# Patient Record
Sex: FEMALE | Race: WHITE | NOT HISPANIC OR LATINO | Employment: FULL TIME | URBAN - NONMETROPOLITAN AREA
[De-identification: names, ages, dates, MRNs, and addresses within clinical notes are randomized per-mention and may not be internally consistent; named-entity substitution may affect disease eponyms.]

---

## 2018-12-07 ENCOUNTER — HOSPITAL ENCOUNTER (INPATIENT)
Facility: HOSPITAL | Age: 48
LOS: 2 days | Discharge: HOME | End: 2018-12-09
Attending: PSYCHIATRY & NEUROLOGY | Admitting: PSYCHIATRY & NEUROLOGY

## 2018-12-07 DIAGNOSIS — I63.442 CEREBROVASCULAR ACCIDENT (CVA) DUE TO EMBOLISM OF LEFT CEREBELLAR ARTERY (HCC): ICD-10-CM

## 2018-12-07 LAB
ALBUMIN SERPL-MCNC: 4.3 G/DL (ref 3.5–5)
ALBUMIN/GLOB SERPL: 1.2 G/DL (ref 1.1–2.5)
ALP SERPL-CCNC: 66 U/L (ref 24–120)
ALT SERPL W P-5'-P-CCNC: 22 U/L (ref 0–54)
ANION GAP SERPL CALCULATED.3IONS-SCNC: 12 MMOL/L (ref 4–13)
AST SERPL-CCNC: 22 U/L (ref 7–45)
BILIRUB SERPL-MCNC: 0.8 MG/DL (ref 0.1–1)
BUN BLD-MCNC: 13 MG/DL (ref 5–21)
BUN/CREAT SERPL: 18.6 (ref 7–25)
CALCIUM SPEC-SCNC: 9.4 MG/DL (ref 8.4–10.4)
CHLORIDE SERPL-SCNC: 101 MMOL/L (ref 98–110)
CO2 SERPL-SCNC: 27 MMOL/L (ref 24–31)
CREAT BLD-MCNC: 0.7 MG/DL (ref 0.5–1.4)
DEPRECATED RDW RBC AUTO: 37.7 FL (ref 40–54)
ERYTHROCYTE [DISTWIDTH] IN BLOOD BY AUTOMATED COUNT: 12.8 % (ref 12–15)
GFR SERPL CREATININE-BSD FRML MDRD: 89 ML/MIN/1.73
GLOBULIN UR ELPH-MCNC: 3.5 GM/DL
GLUCOSE BLD-MCNC: 88 MG/DL (ref 70–100)
HCT VFR BLD AUTO: 43.5 % (ref 37–47)
HGB BLD-MCNC: 14.5 G/DL (ref 12–16)
MCH RBC QN AUTO: 27.3 PG (ref 28–32)
MCHC RBC AUTO-ENTMCNC: 33.3 G/DL (ref 33–36)
MCV RBC AUTO: 81.9 FL (ref 82–98)
PLATELET # BLD AUTO: 385 10*3/MM3 (ref 130–400)
PMV BLD AUTO: 9.3 FL (ref 6–12)
POTASSIUM BLD-SCNC: 4.3 MMOL/L (ref 3.5–5.3)
PROT SERPL-MCNC: 7.8 G/DL (ref 6.3–8.7)
RBC # BLD AUTO: 5.31 10*6/MM3 (ref 4.2–5.4)
SODIUM BLD-SCNC: 140 MMOL/L (ref 135–145)
WBC NRBC COR # BLD: 9.65 10*3/MM3 (ref 4.8–10.8)

## 2018-12-07 PROCEDURE — 80053 COMPREHEN METABOLIC PANEL: CPT | Performed by: PSYCHIATRY & NEUROLOGY

## 2018-12-07 PROCEDURE — 85027 COMPLETE CBC AUTOMATED: CPT | Performed by: PSYCHIATRY & NEUROLOGY

## 2018-12-07 RX ORDER — ACETAMINOPHEN 325 MG/1
650 TABLET ORAL EVERY 4 HOURS PRN
Status: DISCONTINUED | OUTPATIENT
Start: 2018-12-07 | End: 2018-12-09 | Stop reason: HOSPADM

## 2018-12-07 RX ORDER — ATORVASTATIN CALCIUM 40 MG/1
80 TABLET, FILM COATED ORAL NIGHTLY
Status: DISCONTINUED | OUTPATIENT
Start: 2018-12-07 | End: 2018-12-09 | Stop reason: HOSPADM

## 2018-12-07 RX ORDER — SODIUM CHLORIDE 0.9 % (FLUSH) 0.9 %
3-10 SYRINGE (ML) INJECTION AS NEEDED
Status: DISCONTINUED | OUTPATIENT
Start: 2018-12-07 | End: 2018-12-09 | Stop reason: HOSPADM

## 2018-12-07 RX ORDER — SODIUM CHLORIDE 0.9 % (FLUSH) 0.9 %
3 SYRINGE (ML) INJECTION EVERY 12 HOURS SCHEDULED
Status: DISCONTINUED | OUTPATIENT
Start: 2018-12-07 | End: 2018-12-09 | Stop reason: HOSPADM

## 2018-12-07 RX ORDER — ASPIRIN 325 MG
325 TABLET ORAL DAILY
Status: DISCONTINUED | OUTPATIENT
Start: 2018-12-08 | End: 2018-12-08

## 2018-12-07 RX ORDER — ACETAMINOPHEN 650 MG/1
650 SUPPOSITORY RECTAL EVERY 4 HOURS PRN
Status: DISCONTINUED | OUTPATIENT
Start: 2018-12-07 | End: 2018-12-09 | Stop reason: HOSPADM

## 2018-12-07 RX ORDER — ONDANSETRON 2 MG/ML
4 INJECTION INTRAMUSCULAR; INTRAVENOUS EVERY 6 HOURS PRN
Status: DISCONTINUED | OUTPATIENT
Start: 2018-12-07 | End: 2018-12-09 | Stop reason: HOSPADM

## 2018-12-07 RX ORDER — ASPIRIN 300 MG/1
300 SUPPOSITORY RECTAL DAILY
Status: DISCONTINUED | OUTPATIENT
Start: 2018-12-08 | End: 2018-12-08

## 2018-12-07 RX ADMIN — DESMOPRESSIN ACETATE 80 MG: 0.2 TABLET ORAL at 21:31

## 2018-12-07 RX ADMIN — SODIUM CHLORIDE, PRESERVATIVE FREE 3 ML: 5 INJECTION INTRAVENOUS at 21:31

## 2018-12-07 RX ADMIN — ACETAMINOPHEN 650 MG: 325 TABLET, FILM COATED ORAL at 21:31

## 2018-12-08 ENCOUNTER — APPOINTMENT (OUTPATIENT)
Dept: MRI IMAGING | Facility: HOSPITAL | Age: 48
End: 2018-12-08

## 2018-12-08 ENCOUNTER — APPOINTMENT (OUTPATIENT)
Dept: CT IMAGING | Facility: HOSPITAL | Age: 48
End: 2018-12-08

## 2018-12-08 PROBLEM — I63.9 STROKE (HCC): Status: ACTIVE | Noted: 2018-12-08

## 2018-12-08 LAB
ALBUMIN SERPL-MCNC: 3.8 G/DL (ref 3.5–5)
ALBUMIN/GLOB SERPL: 1.5 G/DL (ref 1.1–2.5)
ALP SERPL-CCNC: 51 U/L (ref 24–120)
ALT SERPL W P-5'-P-CCNC: 28 U/L (ref 0–54)
ANION GAP SERPL CALCULATED.3IONS-SCNC: 12 MMOL/L (ref 4–13)
ARTICHOKE IGE QN: 117 MG/DL (ref 0–99)
AST SERPL-CCNC: 21 U/L (ref 7–45)
BILIRUB SERPL-MCNC: 0.7 MG/DL (ref 0.1–1)
BUN BLD-MCNC: 18 MG/DL (ref 5–21)
BUN/CREAT SERPL: 23.1 (ref 7–25)
CALCIUM SPEC-SCNC: 9.3 MG/DL (ref 8.4–10.4)
CHLORIDE SERPL-SCNC: 103 MMOL/L (ref 98–110)
CHOLEST SERPL-MCNC: 166 MG/DL (ref 130–200)
CO2 SERPL-SCNC: 25 MMOL/L (ref 24–31)
CREAT BLD-MCNC: 0.78 MG/DL (ref 0.5–1.4)
DEPRECATED RDW RBC AUTO: 38 FL (ref 40–54)
ERYTHROCYTE [DISTWIDTH] IN BLOOD BY AUTOMATED COUNT: 12.7 % (ref 12–15)
GFR SERPL CREATININE-BSD FRML MDRD: 78 ML/MIN/1.73
GLOBULIN UR ELPH-MCNC: 2.6 GM/DL
GLUCOSE BLD-MCNC: 85 MG/DL (ref 70–100)
GLUCOSE BLDC GLUCOMTR-MCNC: 140 MG/DL (ref 70–130)
GLUCOSE BLDC GLUCOMTR-MCNC: 82 MG/DL (ref 70–130)
HBA1C MFR BLD: 4.9 %
HCT VFR BLD AUTO: 41.9 % (ref 37–47)
HDLC SERPL-MCNC: 37 MG/DL
HGB BLD-MCNC: 13.8 G/DL (ref 12–16)
LDLC/HDLC SERPL: 2.83 {RATIO}
MCH RBC QN AUTO: 27.2 PG (ref 28–32)
MCHC RBC AUTO-ENTMCNC: 32.9 G/DL (ref 33–36)
MCV RBC AUTO: 82.5 FL (ref 82–98)
PLATELET # BLD AUTO: 364 10*3/MM3 (ref 130–400)
PMV BLD AUTO: 9.6 FL (ref 6–12)
POTASSIUM BLD-SCNC: 4.5 MMOL/L (ref 3.5–5.3)
PROT SERPL-MCNC: 6.4 G/DL (ref 6.3–8.7)
RBC # BLD AUTO: 5.08 10*6/MM3 (ref 4.2–5.4)
SODIUM BLD-SCNC: 140 MMOL/L (ref 135–145)
TRIGL SERPL-MCNC: 122 MG/DL (ref 0–149)
WBC NRBC COR # BLD: 8.65 10*3/MM3 (ref 4.8–10.8)

## 2018-12-08 PROCEDURE — 70553 MRI BRAIN STEM W/O & W/DYE: CPT

## 2018-12-08 PROCEDURE — 97161 PT EVAL LOW COMPLEX 20 MIN: CPT

## 2018-12-08 PROCEDURE — G8980 MOBILITY D/C STATUS: HCPCS

## 2018-12-08 PROCEDURE — G8987 SELF CARE CURRENT STATUS: HCPCS | Performed by: OCCUPATIONAL THERAPIST

## 2018-12-08 PROCEDURE — 70498 CT ANGIOGRAPHY NECK: CPT

## 2018-12-08 PROCEDURE — 83036 HEMOGLOBIN GLYCOSYLATED A1C: CPT | Performed by: PSYCHIATRY & NEUROLOGY

## 2018-12-08 PROCEDURE — 97165 OT EVAL LOW COMPLEX 30 MIN: CPT | Performed by: OCCUPATIONAL THERAPIST

## 2018-12-08 PROCEDURE — G8988 SELF CARE GOAL STATUS: HCPCS | Performed by: OCCUPATIONAL THERAPIST

## 2018-12-08 PROCEDURE — 80053 COMPREHEN METABOLIC PANEL: CPT | Performed by: PSYCHIATRY & NEUROLOGY

## 2018-12-08 PROCEDURE — 82962 GLUCOSE BLOOD TEST: CPT

## 2018-12-08 PROCEDURE — 0 GADOBENATE DIMEGLUMINE 529 MG/ML SOLUTION: Performed by: PSYCHIATRY & NEUROLOGY

## 2018-12-08 PROCEDURE — G8989 SELF CARE D/C STATUS: HCPCS | Performed by: OCCUPATIONAL THERAPIST

## 2018-12-08 PROCEDURE — 70496 CT ANGIOGRAPHY HEAD: CPT

## 2018-12-08 PROCEDURE — A9577 INJ MULTIHANCE: HCPCS | Performed by: PSYCHIATRY & NEUROLOGY

## 2018-12-08 PROCEDURE — G8979 MOBILITY GOAL STATUS: HCPCS

## 2018-12-08 PROCEDURE — G8978 MOBILITY CURRENT STATUS: HCPCS

## 2018-12-08 PROCEDURE — 99223 1ST HOSP IP/OBS HIGH 75: CPT | Performed by: PSYCHIATRY & NEUROLOGY

## 2018-12-08 PROCEDURE — 0 IOPAMIDOL PER 1 ML: Performed by: PSYCHIATRY & NEUROLOGY

## 2018-12-08 PROCEDURE — 85027 COMPLETE CBC AUTOMATED: CPT | Performed by: PSYCHIATRY & NEUROLOGY

## 2018-12-08 PROCEDURE — 80061 LIPID PANEL: CPT | Performed by: PSYCHIATRY & NEUROLOGY

## 2018-12-08 RX ORDER — LISINOPRIL 20 MG/1
20 TABLET ORAL DAILY
COMMUNITY
End: 2018-12-18

## 2018-12-08 RX ORDER — CETIRIZINE HYDROCHLORIDE 10 MG/1
10 TABLET ORAL DAILY
COMMUNITY

## 2018-12-08 RX ORDER — ASPIRIN 81 MG/1
81 TABLET, CHEWABLE ORAL DAILY
Status: DISCONTINUED | OUTPATIENT
Start: 2018-12-09 | End: 2018-12-09 | Stop reason: HOSPADM

## 2018-12-08 RX ADMIN — SODIUM CHLORIDE, PRESERVATIVE FREE 3 ML: 5 INJECTION INTRAVENOUS at 20:37

## 2018-12-08 RX ADMIN — SODIUM CHLORIDE, PRESERVATIVE FREE 3 ML: 5 INJECTION INTRAVENOUS at 10:02

## 2018-12-08 RX ADMIN — IOPAMIDOL 125 ML: 755 INJECTION, SOLUTION INTRAVENOUS at 14:27

## 2018-12-08 RX ADMIN — GADOBENATE DIMEGLUMINE 10 ML: 529 INJECTION, SOLUTION INTRAVENOUS at 09:20

## 2018-12-08 RX ADMIN — DESMOPRESSIN ACETATE 80 MG: 0.2 TABLET ORAL at 20:37

## 2018-12-08 RX ADMIN — ASPIRIN 325 MG: 325 TABLET ORAL at 10:02

## 2018-12-08 NOTE — PLAN OF CARE
Problem: Stroke (Ischemic) (Adult)  Goal: Signs and Symptoms of Listed Potential Problems Will be Absent, Minimized or Managed (Stroke)  Outcome: Ongoing (interventions implemented as appropriate)      Problem: Fall Risk (Adult)  Goal: Identify Related Risk Factors and Signs and Symptoms  Outcome: Ongoing (interventions implemented as appropriate)      Problem: Patient Care Overview  Goal: Plan of Care Review  Outcome: Ongoing (interventions implemented as appropriate)   12/08/18 0119   Coping/Psychosocial   Plan of Care Reviewed With patient   Plan of Care Review   Progress improving   OTHER   Outcome Summary Pt a& o x4. No neuro changes. Reports symptoms have resolved. Up ad [place]. Denies any pain. VSS. Safety maintained.

## 2018-12-08 NOTE — PLAN OF CARE
Problem: Patient Care Overview  Goal: Plan of Care Review  Outcome: Ongoing (interventions implemented as appropriate)   12/08/18 4895   Coping/Psychosocial   Plan of Care Reviewed With patient   Plan of Care Review   Progress improving   OTHER   Outcome Summary OT eval completed. Pt was I with all bed mobility, t/fs, toileting, LB dressing and functional mobility. Pt is at her PLOF and does not require skilled OT services at this time. Anticipated d/c from with assist. OT to sign off.

## 2018-12-08 NOTE — PLAN OF CARE
Problem: Patient Care Overview  Goal: Plan of Care Review  Outcome: Ongoing (interventions implemented as appropriate)   12/08/18 0209   Coping/Psychosocial   Plan of Care Reviewed With patient   OTHER   Outcome Summary PT IE complete. Pt independent w/ mobility. No skilled PT needs. PT to sign off. Thank you for referral.

## 2018-12-08 NOTE — THERAPY DISCHARGE NOTE
Acute Care - Physical Therapy Initial Eval/Discharge  [facility]     Patient Name: Albina Lopez  : 1970  MRN: [id number]  Today's Date: 2018   Onset of Illness/Injury or Date of Surgery: 18  Date of Referral to PT: 18  Referring Physician: Dr. Clark      Admit Date: 2018    Visit Dx:    ICD-10-CM ICD-9-CM   1. 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 No pertinent past medical history.  History reviewed. No pertinent surgical history.       PT ASSESSMENT (last 12 hours)      Physical Therapy Evaluation     Row Name 18 1515          PT Evaluation Time/Intention    Subjective Information  complains of;dizziness much improved  -     Document Type  evaluation  -     Mode of Treatment  physical therapy  -     Row Name 18 1515          General Information    Patient Profile Reviewed?  yes  -     Onset of Illness/Injury or Date of Surgery  18  -     Referring Physician  Dr. Clark  -     Patient Observations  alert;cooperative;agree to therapy  -     General Observations of Patient  fowlers in bed  -     Prior Level of Function  independent:;all household mobility;community mobility;ADL's  -     Equipment Currently Used at Home  none  -     Pertinent History of Current Functional Problem  acute cerebellar infarct  -     Row Name 18 1515          Relationship/Environment    Primary Source of Support/Comfort  spouse  -     Lives With  spouse  -     Row Name 18 1515          Resource/Environmental Concerns    Current Living Arrangements  home/apartment/condo  -     Row Name 18 1515          Home Main Entrance    Number of Stairs, Main Entrance  ten  -     Stair Railings, Main Entrance  none  -     Row Name 18 1515          Stairs Within Home, Primary    Stairs, Within Home,  Primary  15  -     Stair Railings, Within Home, Primary  railing on right side (ascending)  -[facility] Name 12/08/18 1515          Cognitive Assessment/Intervention- PT/OT    Affect/Mental Status (Cognitive)  WNL  -     Orientation Status (Cognition)  oriented x 4  -[facility] Name 12/08/18 1515          Bed Mobility Assessment/Treatment    Comment (Bed Mobility)  independent  -[facility] Name 12/08/18 1515          Transfer Assessment/Treatment    Comment (Transfers)  independent  -[facility] Name 12/08/18 1515          Gait/Stairs Assessment/Training    [place] Level (Gait)  independent  -     Distance in Feet (Gait)  300  -     [place] Level (Stairs)  independent  -     Handrail Location (Stairs)  right side (ascending)  -     Number of Steps (Stairs)  6  -     Ascending Technique (Stairs)  step-over-step  -     Descending Technique (Stairs)  step-over-step  -     Comment (Gait/Stairs)  gait w/ head movments, turn and stop  -[facility] Name 12/08/18 1515          General ROM    GENERAL ROM COMMENTS  WFL  -[facility] Name 12/08/18 1515          MMT (Manual Muscle Testing)    General MMT Comments  5/5  -[facility] Name 12/08/18 1515          Pain Assessment    Additional Documentation  Pain Scale: Numbers Pre/Post-Treatment (Group)  -[facility] Name 12/08/18 1515          Pain Scale: Numbers Pre/Post-Treatment    Pain Scale: Numbers, Pretreatment  0/10 - no pain  -     Pain Intervention(s)  Medication (See MAR)  -[facility] Name 12/08/18 1515          Plan of Care Review    Plan of Care Reviewed With  patient  -[facility] Name 12/08/18 1515          Physical Therapy Clinical Impression    Date of Referral to PT  12/07/18  -[facility] Name 12/08/18 1515          Positioning and Restraints    Pre-Treatment Position  in bed  -     Post Treatment Position  bed  -     In Bed  fowlers;call light within reach;encouraged to call for assist;with family/caregiver;side rails up x2  -      Row Name 12/08/18 1515          Living Environment    Home Accessibility  stairs to enter home;stairs within home  -       User Key  (r) = Recorded By, (t) = Taken By, (c) = Cosigned By    Initials Name Provider Type     Jesus Alberto Alexandre, PT Physical Therapist              PT Recommendation and Plan  Anticipated Discharge Disposition (PT): home  Therapy Frequency (PT Clinical Impression): evaluation only  Outcome Summary/Treatment Plan (PT)  Anticipated Discharge Disposition (PT): home  Plan of Care Reviewed With: patient  Outcome Summary: PT IE complete.  Pt independent w/ mobility.  No skilled PT needs.  PT to sign off.  Thank you for referral.    Outcome Measures     Row Name 12/08/18 1549 12/08/18 1500          How much help from another person do you currently need...    Turning from your back to your side while in flat bed without using bedrails?  4  -LH  --     Moving from lying on back to sitting on the side of a flat bed without bedrails?  4  -LH  --     Moving to and from a bed to a chair (including a wheelchair)?  4  -LH  --     Standing up from a chair using your arms (e.g., wheelchair, bedside chair)?  4  -LH  --     Climbing 3-5 steps with a railing?  4  -LH  --     To walk in hospital room?  4  -LH  --     AM-PAC 6 Clicks Score  24  -LH  --        How much help from another is currently needed...    Putting on and taking off regular lower body clothing?  --  4  -JJ     Bathing (including washing, rinsing, and drying)  --  4  -JJ     Toileting (which includes using toilet bed pan or urinal)  --  4  -JJ     Putting on and taking off regular upper body clothing  --  4  -JJ     Taking care of personal grooming (such as brushing teeth)  --  4  -JJ     Eating meals  --  4  -JJ     Score  --  24  -JJ        Modified [place] Scale    Modified [place] Scale  1 - No significant disability despite symptoms.  Able to carry out all usual duties and activities.  -LH  --        Functional Assessment    Outcome  Measure Options  AM-PAC 6 Clicks Basic Mobility (PT);Modified [place]  -  AM-PAC 6 Clicks Daily Activity (OT)  -J       User Key  (r) = Recorded By, (t) = Taken By, (c) = Cosigned By    Initials Name Provider Type     Jesus Alberto Alexandre, PT Physical Therapist    Carmen Muñoz, OTR/L Occupational Therapist           Time Calculation:   PT Charges     Row Name 12/08/18 1552             Time Calculation    Start Time  1515  -      Stop Time  1545  -      Time Calculation (min)  30 min  -      PT Received On  12/08/18  -        User Key  (r) = Recorded By, (t) = Taken By, (c) = Cosigned By    Initials Name Provider Type     Jesus Alberto Alexandre, PT Physical Therapist        Therapy Suggested Charges     Code   Minutes Charges    None           Therapy Charges for Today     Code Description Service Date Service Provider Modifiers Qty    80498323034 HC PT MOBILITY CURRENT 12/8/2018 Jesus Alberto Alexandre, PT GP, CH 1    74456528976 HC PT MOBILITY PROJECTED 12/8/2018 Jesus Alberto Alexandre, PT GP, CH 1    55229172794 HC PT MOBILITY DISCHARGE 12/8/2018 Jesus Alberto Alexandre, PT GP, CH 1    55597431615 HC PT EVAL LOW COMPLEXITY 2 12/8/2018 Jesus Alberto Alexandre, PT GP, KX 1          PT G-Codes  Outcome Measure Options: AM-PAC 6 Clicks Basic Mobility (PT), Modified [place]  AM-PAC 6 Clicks Score: 24  Score: 24  Modified [place] Scale: 1 - No significant disability despite symptoms.  Able to carry out all usual duties and activities.  Functional Limitation: Mobility: Walking and moving around  Mobility: Walking and Moving Around Current Status (): 0 percent impaired, limited or restricted  Mobility: Walking and Moving Around Goal Status (): 0 percent impaired, limited or restricted  Mobility: Walking and Moving Around Discharge Status (): 0 percent impaired, limited or restricted    PT Discharge Summary  Anticipated Discharge Disposition (PT): home  Reason for Discharge: Independent  Outcomes Achieved: (1x visit)  Discharge Destination:  Home    Jesus Alberto Alexandre, PT  12/8/2018

## 2018-12-08 NOTE — PLAN OF CARE
Problem: Patient Care Overview  Goal: Plan of Care Review  Outcome: Outcome(s) achieved Date Met: 12/08/18 12/08/18 4313   OTHER   Outcome Summary Cardiac diet, oral inake insufficient at this time. Consult for If HgbA1c >7.5%; pt HgbA1c 4.9%. Continue to monitor

## 2018-12-08 NOTE — H&P
Neurology History & Physical    Indication for Admission: stroke    History of present illness:      48-year-old female with no significant medical history other than hypertension who on Tuesday of this week had a sudden onset of some mild headache, vertigo, right arm and leg discrimination.  This waxed and waned over the next 3 days.  She did notice some difficulties with swallowing as well.  On Friday she found visited her primary care physician who sent her to the local emergency department in [facility].  There head CT showed concern for cerebellar stroke.  She was accepted in transfer here.  This morning she feels better.  She is tolerating a by mouth diet well.  She has some mild residual vertigo but otherwise has no complaints of ataxia.  She denies any weakness or numbness.  She denies a headache currently.  She has no family history of hypercoagulable states.    No past medical history on file.  Past mental history is hypertension only.  No Known Allergies  Medications Prior to Admission   Medication Sig Dispense Refill Last Dose   • cetirizine (zyrTEC) 10 MG tablet Take 10 mg by mouth Daily.   12/7/2018 at Unknown time   • lisinopril (PRINIVIL,ZESTRIL) 20 MG tablet Take 20 mg by mouth Daily.   12/7/2018 at Unknown time       Social History     Socioeconomic History   • Marital status:      Spouse name: Not on file   • Number of children: Not on file   • Years of education: Not on file   • Highest education level: Not on file   Social Needs   • Financial resource strain: Not on file   • Food insecurity - worry: Not on file   • Food insecurity - inability: Not on file   • Transportation needs - medical: Not on file   • Transportation needs - non-medical: Not on file   Occupational History   • Not on file   Tobacco Use   • Smoking status: Former Smoker   Substance and Sexual Activity   • Alcohol use: Not on file   • Drug use: Not on file   • Sexual activity: Not on file   Other Topics Concern   • Not  on file   Social History Narrative   • Not on file   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  She does not use out all tobacco or drugs.  She is  and her  is at the bedside  No family history on file.  Her mother had a heart attack in her 60s  Review of Systems  Review of Systems - a 14 point review of systems was performed was positive only for ataxia.    Vital Signs   Temp:  [97.3 °F (36.3 °C)-99.1 °F (37.3 °C)] 99.1 °F (37.3 °C)  Heart Rate:  [71-99] 99  Resp:  [16-18] 16  BP: ()/(51-79) 98/56    General Exam:  Head:  Normal cephalic, atraumatic  HEENT:  Neck supple  Fundoscopic Exam:  No signs of disc edema  CVS:  Regular rate and rhythm.  No murmurs  Carotid Examination:  No bruits  Lungs:  Clear to auscultation  Abdomen:  Non-tender, Non-distended  Extremities:  No signs of peripheral edema  Skin:  No rashes    Neurologic Exam:    Mental Status:    -Awake, Alert, Oriented X 3  -No word finding difficulties  -No aphasia  -No dysarthria  -Follows simple and complex commands    CN II:  Visual fields full.  Pupils equally reactive to light  CN III, IV, VI:  Extraocular Muscles full with no signs of nystagmus  CN V:  Facial sensory is symmetric with no asymmetries.  CN VII:  Facial motor symmetric  CN VIII:  Gross hearing intact bilaterally  CN IX:  Palate elevates symmetrically  CN X:  Palate elevates symmetrically  CN XI:  Shoulder shrug symmetric  CN XII:  Tongue is midline on protrusion    Motor: (strength out of 5:  1= minimal movement, 2 = movement in plane of gravity, 3 = movement against gravity, 4 = movement against some resistance, 5 = full strength)    -Right Upper Ext: Proximal: 5 Distal: 5  -Left Upper Ext: Proximal: 5 Distal: 5    -Right Lower Ext: Proximal: 5 Distal: 5  -Left Lower Ext: Proximal: 5 Distal: 5    DTR:  -Right   Bicep: 2+ Tricep: 2+ Brachoradialis: 2+   Patella: 2+ Ankle: 2+ Neg Babinski  -Left   Bicep: 2+ Tricep: 2+ Brachoradialis: 2+   Patella: 2+ Ankle:  2+ Neg Babinski    Sensory:  -Intact to light touch, pinprick, temperature, pain, and proprioception    Coordination:  -Finger to nose intact  -Heel to shin intact  -No ataxia    Gait  -No signs of ataxia  -ambulates unassisted      Results Review:  Lab Results (last 7 days)     Procedure Component Value Units Date/Time    POC Glucose Once [[id number]]  (Abnormal) Collected:  12/08/18 1201    Specimen:  Blood Updated:  12/08/18 1212     Glucose 140 mg/dL      Comment: : [id number] Sagar CollazoMeter ID: [id number]       Hemoglobin A1c [[id number]] Collected:  12/08/18 0357    Specimen:  Blood Updated:  12/08/18 0616     Hemoglobin A1C 4.9 %     Narrative:       Less than 6.0           Non-Diabetic Range  6.0-7.0                 ADA Therapeutic Target  Greater than 7.0        Action Suggested    Lipid Panel [[id number]]  (Abnormal) Collected:  12/08/18 0357    Specimen:  Blood Updated:  12/08/18 0536     Total Cholesterol 166 mg/dL      Triglycerides 122 mg/dL      HDL Cholesterol 37 mg/dL      LDL Cholesterol  117 mg/dL      LDL/HDL Ratio 2.83    Comprehensive Metabolic Panel [[id number]] Collected:  12/08/18 0357    Specimen:  Blood Updated:  12/08/18 0525     Glucose 85 mg/dL      BUN 18 mg/dL      Creatinine 0.78 mg/dL      Sodium 140 mmol/L      Potassium 4.5 mmol/L      Chloride 103 mmol/L      CO2 25.0 mmol/L      Calcium 9.3 mg/dL      Total Protein 6.4 g/dL      Albumin 3.80 g/dL      ALT (SGPT) 28 U/L      AST (SGOT) 21 U/L      Alkaline Phosphatase 51 U/L      Total Bilirubin 0.7 mg/dL      eGFR Non African Amer 78 mL/min/1.73      Globulin 2.6 gm/dL      A/G Ratio 1.5 g/dL      BUN/Creatinine Ratio 23.1     Anion Gap 12.0 mmol/L     CBC (No Diff) [[id number]]  (Abnormal) Collected:  12/08/18 0357    Specimen:  Blood Updated:  12/08/18 0514     WBC 8.65 10*3/mm3      RBC 5.08 10*6/mm3      Hemoglobin 13.8 g/dL      Hematocrit 41.9 %      MCV 82.5 fL      MCH 27.2 pg      MCHC 32.9 g/dL      RDW 12.7 %       RDW-SD 38.0 fl      MPV 9.6 fL      Platelets 364 10*3/mm3     Comprehensive Metabolic Panel [[id number]] Collected:  12/07/18 2050    Specimen:  Blood Updated:  12/07/18 2113     Glucose 88 mg/dL      BUN 13 mg/dL      Creatinine 0.70 mg/dL      Sodium 140 mmol/L      Potassium 4.3 mmol/L      Chloride 101 mmol/L      CO2 27.0 mmol/L      Calcium 9.4 mg/dL      Total Protein 7.8 g/dL      Albumin 4.30 g/dL      ALT (SGPT) 22 U/L      AST (SGOT) 22 U/L      Alkaline Phosphatase 66 U/L      Total Bilirubin 0.8 mg/dL      eGFR Non African Amer 89 mL/min/1.73      Globulin 3.5 gm/dL      A/G Ratio 1.2 g/dL      BUN/Creatinine Ratio 18.6     Anion Gap 12.0 mmol/L     CBC (No Diff) [[id number]]  (Abnormal) Collected:  12/07/18 2050    Specimen:  Blood Updated:  12/07/18 2103     WBC 9.65 10*3/mm3      RBC 5.31 10*6/mm3      Hemoglobin 14.5 g/dL      Hematocrit 43.5 %      MCV 81.9 fL      MCH 27.3 pg      MCHC 33.3 g/dL      RDW 12.8 %      RDW-SD 37.7 fl      MPV 9.3 fL      Platelets 385 10*3/mm3         Patient Active Problem List   Diagnosis   • Stroke (CMS/HCC)         MRI of brain reviewed by me shows left paramedian cerebellar stroke in the vermis  Impression:    1.  Acute cerebellar infarct cryptogenic in nature  2.  Hypertension    Plan:    · No tPA secondary presenting outside of the 4.5 hour window  · continue aspirin 81 mg  · Continue Lipitor 80 mg daily  · Cardiac echo pending  · CT angiography of the head and neck  · Therapy consultations  · SCDs for DVT prophylaxis  · Hypercoagulable panel    Andrea Clark MD  12/08/18  1:34 PM

## 2018-12-08 NOTE — THERAPY DISCHARGE NOTE
Acute Care - Occupational Therapy Initial Eval/Discharge  [facility]     Patient Name: Albina Lopez  : 1970  MRN: [id number]  Today's Date: 2018  Onset of Illness/Injury or Date of Surgery: (P) 18  Date of Referral to OT: 18  Referring Physician: (P) Dr. Clark      Admit Date: 2018       ICD-10-CM ICD-9-CM   1. 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 No pertinent past medical history.  History reviewed. No pertinent surgical history.       OT ASSESSMENT FLOWSHEET (last 72 hours)      Occupational Therapy Evaluation     Row Name 18 1316                   OT Evaluation Time/Intention    Subjective Information  no complaints  -JJ        Document Type  evaluation  -JJ        Mode of Treatment  occupational therapy;concurrent therapy  -JJ        Patient Effort  good  -JJ           General Information    Patient Profile Reviewed?  yes  -JJ        Onset of Illness/Injury or Date of Surgery  18  -JJ        Referring Physician  Dr. Clark  -JJ        Patient Observations  alert;agree to therapy;cooperative  -JJ        Patient/Family Observations  --  -JJ        General Observations of Patient  fowlers in bed, pulse ox, IV infusing, telemetry.   -JJ        Prior Level of Function  independent:;all household mobility;community mobility;transfer;bed mobility;ADL's  -JJ        Equipment Currently Used at Home  none  -JJ        Pertinent History of Current Functional Problem  Pt t/f to [facility] with stroke symptoms. Pt reports onset of mild headache, vertigo, R arm and leg discrimination on . MRI Brain : Acute cerebellar infarct cryptogenic in nature. Dx: CVA, HTN.   -JJ        Risks Reviewed  patient:;LOB;nausea/vomiting;dizziness;increased discomfort;change in vital signs;increased drainage;lines disloged  -JJ        Benefits Reviewed   patient:;improve function;increase independence;increase strength;increase balance;decrease pain;decrease risk of DVT;improve skin integrity;increase knowledge  -           Relationship/Environment    Primary Source of Support/Comfort  spouse  -J        Lives With  spouse  -        Family Caregiver if Needed  spouse  -           Resource/Environmental Concerns    Current Living Arrangements  home/apartment/condo  -           Home Main Entrance    Number of Stairs, Main Entrance  ten  -JJ        Stair Railings, Main Entrance  none  -J           Stairs Within Home, Primary    Stairs, Within Home, Primary  15  -JJ        Stair Railings, Within Home, Primary  railing on right side (ascending)  -           Cognitive Assessment/Interventions    Additional Documentation  Cognitive Assessment/Intervention (Group)  -           Cognitive Assessment/Intervention- PT/OT    Affect/Mental Status (Cognitive)  WNL  -        Orientation Status (Cognition)  oriented x 4  -        Follows Commands (Cognition)  WNL  -        Cognitive Function (Cognitive)  WNL  -        Personal Safety Interventions  fall prevention program maintained;gait belt;muscle strengthening facilitated;nonskid shoes/slippers when out of bed;supervised activity  -           Bed Mobility Assessment/Treatment    Bed Mobility Assessment/Treatment  scooting/bridging;supine-sit;sit-supine  -        Scooting/Bridging [place] (Bed Mobility)  independent  -        Supine-Sit [place] (Bed Mobility)  independent  -        Sit-Supine [place] (Bed Mobility)  independent  -           Functional Mobility    Functional Mobility- Ind. Level  independent  -        Functional Mobility- Comment  Amb from bed to end of hallway and back to bed with no LOB independently.   -           Transfer Assessment/Treatment    Transfer Assessment/Treatment  sit-stand transfer;stand-sit transfer  -           Sit-Stand Transfer    Sit-Stand  [place] (Transfers)  independent  -J           Stand-Sit Transfer    Stand-Sit [place] (Transfers)  independent  -           ADL Assessment/Intervention    BADL Assessment/Intervention  lower body dressing;toileting  -           Lower Body Dressing Assessment/Training    Comment (Lower Body Dressing)  per pt report, she independently donned pants earlier this pm  -J           Toileting Assessment/Training    Comment (Toileting)  per pt report she has been up to the bathroom independently.   -J           General ROM    GENERAL ROM COMMENTS  BUE AROM WFL   -           MMT (Manual Muscle Testing)    General MMT Comments  BUE strength functionally 5/5.  -           Motor Assessment/Interventions    Additional Documentation  Balance (Group)  -           Balance    Balance  static sitting balance;static standing balance;dynamic sitting balance;dynamic standing balance  -           Static Sitting Balance    Level of [place] (Unsupported Sitting, Static Balance)  independent  -        Sitting Position (Unsupported Sitting, Static Balance)  sitting on edge of bed  -           Dynamic Sitting Balance    Level of [place], Reaches Outside Midline (Sitting, Dynamic Balance)  independent  -        Sitting Position, Reaches Outside Midline (Sitting, Dynamic Balance)  sitting on edge of bed  -           Static Standing Balance    Level of [place] (Supported Standing, Static Balance)  independent  -           Dynamic Standing Balance    Level of [place], Reaches Outside Midline (Standing, Dynamic Balance)  independent  -           Sensory Assessment/Intervention    Sensory General Assessment  no sensation deficits identified  -           Positioning and Restraints    Pre-Treatment Position  in bed  -JJ        Post Treatment Position  bed  -J        In Bed  fowlers;call light within reach;encouraged to call for assist;side rails up x2;SCD pump applied  -J           Pain  Assessment    Additional Documentation  Pain Scale: Numbers Pre/Post-Treatment (Group)  -           Pain Scale: Numbers Pre/Post-Treatment    Pain Scale: Numbers, Pretreatment  0/10 - no pain  -        Pain Scale: Numbers, Post-Treatment  0/10 - no pain  -           Plan of Care Review    Plan of Care Reviewed With  patient  -JJULIEN           Clinical Impression (OT)    Date of Referral to OT  12/08/18  -        Prognosis (OT Eval)  good  -        Patient/Family Goals Statement (OT Eval)  go home  -        Criteria for Skilled Therapeutic Interventions Met (OT Eval)  no;no problems identified which require skilled intervention  -        Therapy Frequency (OT Eval)  evaluation only  -        Care Plan Review (OT)  evaluation/treatment results reviewed;care plan/treatment goals reviewed;risks/benefits reviewed;current/potential barriers reviewed;patient/other agree to care plan  -        Anticipated Discharge Disposition (OT)  home with assist  -           Living Environment    Home Accessibility  stairs to enter home;stairs within home  -          User Key  (r) = Recorded By, (t) = Taken By, (c) = Cosigned By    Initials Name Effective Dates    Carmen Muñoz, OTR/L 10/12/18 -           Occupational Therapy Education     Title: PT OT SLP Therapies (In Progress)     Topic: Occupational Therapy (In Progress)     Point: ADL training (Done)     Description: Instruct learner(s) on proper safety adaptation and remediation techniques during self care or transfers.   Instruct in proper use of assistive devices.    Learning Progress Summary           Patient Acceptance, E, VU by LILLIAN at 12/8/2018  3:46 PM    Comment:  Pt educated on the benefits of OT, POC, home safety.                   Point: Home exercise program (Done)     Description: Instruct learner(s) on appropriate technique for monitoring, assisting and/or progressing therapeutic exercises/activities.    Learning Progress Summary            Patient Acceptance, E, VU by LILLIAN at 12/8/2018  3:46 PM    Comment:  Pt educated on the benefits of OT, POC, home safety.                               User Key     Initials Effective Dates Name Provider Type Discipline     10/12/18 -  Carmen Roe OTR/L Occupational Therapist OT                OT Recommendation and Plan  Outcome Summary/Treatment Plan (OT)  Anticipated Discharge Disposition (OT): home with assist  Therapy Frequency (OT Eval): evaluation only  Plan of Care Review  Plan of Care Reviewed With: patient  Plan of Care Reviewed With: patient  Outcome Summary: OT eval completed. Pt was I with all bed mobility, t/fs, toileting, LB dressing and functional mobility. Pt is at her PLOF and does not require skilled OT services at this time. Anticipated d/c from with assist. OT to sign off.          Outcome Measures     Row Name 12/08/18 1500             How much help from another is currently needed...    Putting on and taking off regular lower body clothing?  4  -JJ      Bathing (including washing, rinsing, and drying)  4  -JJ      Toileting (which includes using toilet bed pan or urinal)  4  -JJ      Putting on and taking off regular upper body clothing  4  -JJ      Taking care of personal grooming (such as brushing teeth)  4  -JJ      Eating meals  4  -J      Score  24  -         Functional Assessment    Outcome Measure Options  AM-PAC 6 Clicks Daily Activity (OT)  -        User Key  (r) = Recorded By, (t) = Taken By, (c) = Cosigned By    Initials Name Provider Type     Carmen Roe OTR/L Occupational Therapist          Time Calculation:   Time Calculation- OT     Row Name 12/08/18 1544             Time Calculation- OT    OT Start Time  1530 add 13 minutes for chart review   -      OT Stop Time  1540  -      OT Time Calculation (min)  10 min  -      OT Received On  12/08/18  -        User Key  (r) = Recorded By, (t) = Taken By, (c) = Cosigned By    Initials Name Provider Type     Carmen Muñoz OTR/L Occupational Therapist        Therapy Suggested Charges     Code   Minutes Charges    None           Therapy Charges for Today     Code Description Service Date Service Provider Modifiers Qty    [id number]  OT SELFCARE CURRENT 12/8/2018 Carmen Roe OTR/L GO, CH 1    [id number]  OT SELFCARE PROJECTED 12/8/2018 Carmen Roe OTR/L GO, CH 1    [id number] HC OT SELFCARE DISCHARGE 12/8/2018 Carmen Roe OTR/L GO, CH 1    [id number]  OT EVAL LOW COMPLEXITY 2 12/8/2018 Carmen Roe OTR/L GO, KX 1          OT G-codes  OT Professional Judgement Used?: Yes  OT Functional Scales Options: AM-PAC 6 Clicks Daily Activity (OT)  Score: 24  Functional Limitation: Self care  Self Care Current Status (): 0 percent impaired, limited or restricted  Self Care Goal Status (): 0 percent impaired, limited or restricted  Self Care Discharge Status (): 0 percent impaired, limited or restricted    OT Discharge Summary  Anticipated Discharge Disposition (OT): home with assist  Reason for Discharge: At baseline function  Outcomes Achieved: Other(evalx1)  Discharge Destination: Home with assist    NAOMI Crews  12/8/2018

## 2018-12-08 NOTE — PLAN OF CARE
Problem: Stroke (Ischemic) (Adult)  Goal: Signs and Symptoms of Listed Potential Problems Will be Absent, Minimized or Managed (Stroke)  Outcome: Ongoing (interventions implemented as appropriate)      Problem: Fall Risk (Adult)  Goal: Identify Related Risk Factors and Signs and Symptoms  Outcome: Ongoing (interventions implemented as appropriate)    Goal: Absence of Fall  Outcome: Ongoing (interventions implemented as appropriate)      Problem: Patient Care Overview  Goal: Plan of Care Review  Outcome: Ongoing (interventions implemented as appropriate)   12/08/18 0413   Coping/Psychosocial   Plan of Care Reviewed With patient   Plan of Care Review   Progress no change   OTHER   Outcome Summary PT transferred from [facility] [place]. A&O. CO dizziness and is off balance when she stands up and ambulates. CO headache. No neuro changes noted, con to monitor. MRI in am.

## 2018-12-09 ENCOUNTER — APPOINTMENT (OUTPATIENT)
Dept: CARDIOLOGY | Facility: HOSPITAL | Age: 48
End: 2018-12-09
Attending: PSYCHIATRY & NEUROLOGY

## 2018-12-09 ENCOUNTER — APPOINTMENT (OUTPATIENT)
Dept: ULTRASOUND IMAGING | Facility: HOSPITAL | Age: 48
End: 2018-12-09

## 2018-12-09 VITALS
DIASTOLIC BLOOD PRESSURE: 71 MMHG | RESPIRATION RATE: 16 BRPM | SYSTOLIC BLOOD PRESSURE: 119 MMHG | OXYGEN SATURATION: 96 % | WEIGHT: 156.75 LBS | BODY MASS INDEX: 25.19 KG/M2 | HEIGHT: 66 IN | HEART RATE: 82 BPM | TEMPERATURE: 98.8 F

## 2018-12-09 LAB
ALBUMIN SERPL-MCNC: 3.6 G/DL (ref 3.5–5)
ALBUMIN/GLOB SERPL: 1.2 G/DL (ref 1.1–2.5)
ALP SERPL-CCNC: 51 U/L (ref 24–120)
ALT SERPL W P-5'-P-CCNC: 25 U/L (ref 0–54)
ANION GAP SERPL CALCULATED.3IONS-SCNC: 13 MMOL/L (ref 4–13)
AST SERPL-CCNC: 17 U/L (ref 7–45)
AT III PPP CHRO-ACNC: 109 % (ref 84–123)
BH CV ECHO MEAS - AO MAX PG (FULL): 0 MMHG
BH CV ECHO MEAS - AO MAX PG: 6.3 MMHG
BH CV ECHO MEAS - AO MEAN PG (FULL): -1 MMHG
BH CV ECHO MEAS - AO MEAN PG: 3 MMHG
BH CV ECHO MEAS - AO ROOT AREA (BSA CORRECTED): 1.7
BH CV ECHO MEAS - AO ROOT AREA: 7.1 CM^2
BH CV ECHO MEAS - AO ROOT DIAM: 3 CM
BH CV ECHO MEAS - AO V2 MAX: 125 CM/SEC
BH CV ECHO MEAS - AO V2 MEAN: 85.1 CM/SEC
BH CV ECHO MEAS - AO V2 VTI: 21.8 CM
BH CV ECHO MEAS - AVA(I,A): 3.7 CM^2
BH CV ECHO MEAS - AVA(I,D): 3.7 CM^2
BH CV ECHO MEAS - AVA(V,A): 2.8 CM^2
BH CV ECHO MEAS - AVA(V,D): 2.8 CM^2
BH CV ECHO MEAS - BSA(HAYCOCK): 1.8 M^2
BH CV ECHO MEAS - BSA: 1.8 M^2
BH CV ECHO MEAS - BZI_BMI: 25.2 KILOGRAMS/M^2
BH CV ECHO MEAS - BZI_METRIC_HEIGHT: 167.6 CM
BH CV ECHO MEAS - BZI_METRIC_WEIGHT: 70.8 KG
BH CV ECHO MEAS - EDV(CUBED): 59.3 ML
BH CV ECHO MEAS - EDV(MOD-SP4): 60.6 ML
BH CV ECHO MEAS - EDV(TEICH): 65.9 ML
BH CV ECHO MEAS - EF(CUBED): 70.4 %
BH CV ECHO MEAS - EF(MOD-SP4): 70.3 %
BH CV ECHO MEAS - EF(TEICH): 62.7 %
BH CV ECHO MEAS - ESV(CUBED): 17.6 ML
BH CV ECHO MEAS - ESV(MOD-SP4): 18 ML
BH CV ECHO MEAS - ESV(TEICH): 24.6 ML
BH CV ECHO MEAS - FS: 33.3 %
BH CV ECHO MEAS - IVS/LVPW: 0.91
BH CV ECHO MEAS - IVSD: 1 CM
BH CV ECHO MEAS - LA DIMENSION: 3 CM
BH CV ECHO MEAS - LA/AO: 1
BH CV ECHO MEAS - LAT PEAK E' VEL: 7.8 CM/SEC
BH CV ECHO MEAS - LV DIASTOLIC VOL/BSA (35-75): 33.7 ML/M^2
BH CV ECHO MEAS - LV MASS(C)D: 131 GRAMS
BH CV ECHO MEAS - LV MASS(C)DI: 72.8 GRAMS/M^2
BH CV ECHO MEAS - LV MAX PG: 6.3 MMHG
BH CV ECHO MEAS - LV MEAN PG: 4 MMHG
BH CV ECHO MEAS - LV SYSTOLIC VOL/BSA (12-30): 10 ML/M^2
BH CV ECHO MEAS - LV V1 MAX: 125 CM/SEC
BH CV ECHO MEAS - LV V1 MEAN: 87.2 CM/SEC
BH CV ECHO MEAS - LV V1 VTI: 28.1 CM
BH CV ECHO MEAS - LVIDD: 3.9 CM
BH CV ECHO MEAS - LVIDS: 2.6 CM
BH CV ECHO MEAS - LVLD AP4: 8.1 CM
BH CV ECHO MEAS - LVLS AP4: 5.5 CM
BH CV ECHO MEAS - LVOT AREA (M): 2.8 CM^2
BH CV ECHO MEAS - LVOT AREA: 2.8 CM^2
BH CV ECHO MEAS - LVOT DIAM: 1.9 CM
BH CV ECHO MEAS - LVPWD: 1.1 CM
BH CV ECHO MEAS - MED PEAK E' VEL: 14 CM/SEC
BH CV ECHO MEAS - MV A MAX VEL: 83.4 CM/SEC
BH CV ECHO MEAS - MV DEC TIME: 0.23 SEC
BH CV ECHO MEAS - MV E MAX VEL: 59.7 CM/SEC
BH CV ECHO MEAS - MV E/A: 0.72
BH CV ECHO MEAS - SI(AO): 85.6 ML/M^2
BH CV ECHO MEAS - SI(CUBED): 23.2 ML/M^2
BH CV ECHO MEAS - SI(LVOT): 44.3 ML/M^2
BH CV ECHO MEAS - SI(MOD-SP4): 23.7 ML/M^2
BH CV ECHO MEAS - SI(TEICH): 23 ML/M^2
BH CV ECHO MEAS - SV(AO): 154.1 ML
BH CV ECHO MEAS - SV(CUBED): 41.7 ML
BH CV ECHO MEAS - SV(LVOT): 79.7 ML
BH CV ECHO MEAS - SV(MOD-SP4): 42.6 ML
BH CV ECHO MEAS - SV(TEICH): 41.3 ML
BH CV ECHO MEASUREMENTS AVERAGE E/E' RATIO: 5.48
BILIRUB SERPL-MCNC: 0.6 MG/DL (ref 0.1–1)
BUN BLD-MCNC: 14 MG/DL (ref 5–21)
BUN/CREAT SERPL: 20.6 (ref 7–25)
CALCIUM SPEC-SCNC: 9.2 MG/DL (ref 8.4–10.4)
CHLORIDE SERPL-SCNC: 101 MMOL/L (ref 98–110)
CO2 SERPL-SCNC: 24 MMOL/L (ref 24–31)
CREAT BLD-MCNC: 0.68 MG/DL (ref 0.5–1.4)
DEPRECATED RDW RBC AUTO: 38.5 FL (ref 40–54)
ERYTHROCYTE [DISTWIDTH] IN BLOOD BY AUTOMATED COUNT: 12.7 % (ref 12–15)
GFR SERPL CREATININE-BSD FRML MDRD: 92 ML/MIN/1.73
GLOBULIN UR ELPH-MCNC: 2.9 GM/DL
GLUCOSE BLD-MCNC: 87 MG/DL (ref 70–100)
HCT VFR BLD AUTO: 40.9 % (ref 37–47)
HGB BLD-MCNC: 13.5 G/DL (ref 12–16)
LEFT ATRIUM VOLUME INDEX: 22.2 ML/M2
LV EF 2D ECHO EST: 60 %
MAXIMAL PREDICTED HEART RATE: 171 BPM
MCH RBC QN AUTO: 27.7 PG (ref 28–32)
MCHC RBC AUTO-ENTMCNC: 33 G/DL (ref 33–36)
MCV RBC AUTO: 83.8 FL (ref 82–98)
PLATELET # BLD AUTO: 347 10*3/MM3 (ref 130–400)
PMV BLD AUTO: 9.7 FL (ref 6–12)
POTASSIUM BLD-SCNC: 4.3 MMOL/L (ref 3.5–5.3)
PROT SERPL-MCNC: 6.5 G/DL (ref 6.3–8.7)
RBC # BLD AUTO: 4.88 10*6/MM3 (ref 4.2–5.4)
SODIUM BLD-SCNC: 138 MMOL/L (ref 135–145)
STRESS TARGET HR: 145 BPM
WBC NRBC COR # BLD: 9.73 10*3/MM3 (ref 4.8–10.8)

## 2018-12-09 PROCEDURE — 83520 IMMUNOASSAY QUANT NOS NONAB: CPT | Performed by: PSYCHIATRY & NEUROLOGY

## 2018-12-09 PROCEDURE — 86147 CARDIOLIPIN ANTIBODY EA IG: CPT | Performed by: PSYCHIATRY & NEUROLOGY

## 2018-12-09 PROCEDURE — 85670 THROMBIN TIME PLASMA: CPT | Performed by: PSYCHIATRY & NEUROLOGY

## 2018-12-09 PROCEDURE — 85303 CLOT INHIBIT PROT C ACTIVITY: CPT | Performed by: PSYCHIATRY & NEUROLOGY

## 2018-12-09 PROCEDURE — 93880 EXTRACRANIAL BILAT STUDY: CPT

## 2018-12-09 PROCEDURE — 80053 COMPREHEN METABOLIC PANEL: CPT | Performed by: PSYCHIATRY & NEUROLOGY

## 2018-12-09 PROCEDURE — 86146 BETA-2 GLYCOPROTEIN ANTIBODY: CPT | Performed by: PSYCHIATRY & NEUROLOGY

## 2018-12-09 PROCEDURE — 86148 ANTI-PHOSPHOLIPID ANTIBODY: CPT | Performed by: PSYCHIATRY & NEUROLOGY

## 2018-12-09 PROCEDURE — 85300 ANTITHROMBIN III ACTIVITY: CPT | Performed by: PSYCHIATRY & NEUROLOGY

## 2018-12-09 PROCEDURE — 85027 COMPLETE CBC AUTOMATED: CPT | Performed by: PSYCHIATRY & NEUROLOGY

## 2018-12-09 PROCEDURE — 93306 TTE W/DOPPLER COMPLETE: CPT

## 2018-12-09 PROCEDURE — 85613 RUSSELL VIPER VENOM DILUTED: CPT | Performed by: PSYCHIATRY & NEUROLOGY

## 2018-12-09 PROCEDURE — 93880 EXTRACRANIAL BILAT STUDY: CPT | Performed by: SURGERY

## 2018-12-09 PROCEDURE — 83090 ASSAY OF HOMOCYSTEINE: CPT | Performed by: PSYCHIATRY & NEUROLOGY

## 2018-12-09 PROCEDURE — 85705 THROMBOPLASTIN INHIBITION: CPT | Performed by: PSYCHIATRY & NEUROLOGY

## 2018-12-09 PROCEDURE — 93306 TTE W/DOPPLER COMPLETE: CPT | Performed by: INTERNAL MEDICINE

## 2018-12-09 PROCEDURE — 81241 F5 GENE: CPT | Performed by: PSYCHIATRY & NEUROLOGY

## 2018-12-09 PROCEDURE — 99239 HOSP IP/OBS DSCHRG MGMT >30: CPT | Performed by: PSYCHIATRY & NEUROLOGY

## 2018-12-09 PROCEDURE — 85732 THROMBOPLASTIN TIME PARTIAL: CPT | Performed by: PSYCHIATRY & NEUROLOGY

## 2018-12-09 RX ORDER — ATORVASTATIN CALCIUM 80 MG/1
80 TABLET, FILM COATED ORAL NIGHTLY
Qty: 30 TABLET | Refills: 1 | Status: SHIPPED | OUTPATIENT
Start: 2018-12-09 | End: 2019-01-28 | Stop reason: ALTCHOICE

## 2018-12-09 RX ORDER — ASPIRIN 81 MG/1
81 TABLET, CHEWABLE ORAL DAILY
Qty: 30 TABLET | Refills: 1 | Status: SHIPPED | OUTPATIENT
Start: 2018-12-10 | End: 2019-02-08 | Stop reason: SDUPTHER

## 2018-12-09 RX ADMIN — Medication 81 MG: at 08:50

## 2018-12-09 RX ADMIN — SODIUM CHLORIDE, PRESERVATIVE FREE 3 ML: 5 INJECTION INTRAVENOUS at 08:50

## 2018-12-09 NOTE — DISCHARGE SUMMARY
Date of Admission: 12/7/2018  Date of Discharge:  12/9/2018    Admitting Diagnosis: stroke  Discharge Diagnosis:   1.  Acute cerebellar infarct cryptogenic in nature  2.  Hypertension         Presenting Problem/History of Present Illness  Stroke (CMS/HCC) [I63.9]     Pertinent Test Results:   Lab Results (last 72 hours)     Procedure Component Value Units Date/Time    Comprehensive Metabolic Panel [[id number]] Collected:  12/09/18 0421    Specimen:  Blood Updated:  12/09/18 0551     Glucose 87 mg/dL      BUN 14 mg/dL      Creatinine 0.68 mg/dL      Sodium 138 mmol/L      Potassium 4.3 mmol/L      Chloride 101 mmol/L      CO2 24.0 mmol/L      Calcium 9.2 mg/dL      Total Protein 6.5 g/dL      Albumin 3.60 g/dL      ALT (SGPT) 25 U/L      AST (SGOT) 17 U/L      Alkaline Phosphatase 51 U/L      Total Bilirubin 0.6 mg/dL      eGFR Non African Amer 92 mL/min/1.73      Globulin 2.9 gm/dL      A/G Ratio 1.2 g/dL      BUN/Creatinine Ratio 20.6     Anion Gap 13.0 mmol/L     CBC (No Diff) [[id number]]  (Abnormal) Collected:  12/09/18 0421    Specimen:  Blood Updated:  12/09/18 0538     WBC 9.73 10*3/mm3      RBC 4.88 10*6/mm3      Hemoglobin 13.5 g/dL      Hematocrit 40.9 %      MCV 83.8 fL      MCH 27.7 pg      MCHC 33.0 g/dL      RDW 12.7 %      RDW-SD 38.5 fl      MPV 9.7 fL      Platelets 347 10*3/mm3     POC Glucose Once [[id number]]  (Normal) Collected:  12/08/18 1718    Specimen:  Blood Updated:  12/08/18 1732     Glucose 82 mg/dL      Comment: : [id number] Keith NicoleMeter ID: [id number]       POC Glucose Once [[id number]]  (Abnormal) Collected:  12/08/18 1201    Specimen:  Blood Updated:  12/08/18 1212     Glucose 140 mg/dL      Comment: : [id number] Keith NicoleMeter ID: [id number]       Hemoglobin A1c [[id number]] Collected:  12/08/18 0357    Specimen:  Blood Updated:  12/08/18 0616     Hemoglobin A1C 4.9 %     Narrative:       Less than 6.0           Non-Diabetic Range  6.0-7.0                 ADA  Therapeutic Target  Greater than 7.0        Action Suggested    Lipid Panel [[id number]]  (Abnormal) Collected:  12/08/18 0357    Specimen:  Blood Updated:  12/08/18 0536     Total Cholesterol 166 mg/dL      Triglycerides 122 mg/dL      HDL Cholesterol 37 mg/dL      LDL Cholesterol  117 mg/dL      LDL/HDL Ratio 2.83    Comprehensive Metabolic Panel [[id number]] Collected:  12/08/18 0357    Specimen:  Blood Updated:  12/08/18 0525     Glucose 85 mg/dL      BUN 18 mg/dL      Creatinine 0.78 mg/dL      Sodium 140 mmol/L      Potassium 4.5 mmol/L      Chloride 103 mmol/L      CO2 25.0 mmol/L      Calcium 9.3 mg/dL      Total Protein 6.4 g/dL      Albumin 3.80 g/dL      ALT (SGPT) 28 U/L      AST (SGOT) 21 U/L      Alkaline Phosphatase 51 U/L      Total Bilirubin 0.7 mg/dL      eGFR Non African Amer 78 mL/min/1.73      Globulin 2.6 gm/dL      A/G Ratio 1.5 g/dL      BUN/Creatinine Ratio 23.1     Anion Gap 12.0 mmol/L     CBC (No Diff) [[id number]]  (Abnormal) Collected:  12/08/18 0357    Specimen:  Blood Updated:  12/08/18 0514     WBC 8.65 10*3/mm3      RBC 5.08 10*6/mm3      Hemoglobin 13.8 g/dL      Hematocrit 41.9 %      MCV 82.5 fL      MCH 27.2 pg      MCHC 32.9 g/dL      RDW 12.7 %      RDW-SD 38.0 fl      MPV 9.6 fL      Platelets 364 10*3/mm3     Comprehensive Metabolic Panel [[id number]] Collected:  12/07/18 2050    Specimen:  Blood Updated:  12/07/18 2113     Glucose 88 mg/dL      BUN 13 mg/dL      Creatinine 0.70 mg/dL      Sodium 140 mmol/L      Potassium 4.3 mmol/L      Chloride 101 mmol/L      CO2 27.0 mmol/L      Calcium 9.4 mg/dL      Total Protein 7.8 g/dL      Albumin 4.30 g/dL      ALT (SGPT) 22 U/L      AST (SGOT) 22 U/L      Alkaline Phosphatase 66 U/L      Total Bilirubin 0.8 mg/dL      eGFR Non African Amer 89 mL/min/1.73      Globulin 3.5 gm/dL      A/G Ratio 1.2 g/dL      BUN/Creatinine Ratio 18.6     Anion Gap 12.0 mmol/L     CBC (No Diff) [[id number]]  (Abnormal) Collected:  12/07/18 2050     Specimen:  Blood Updated:  12/07/18 2103     WBC 9.65 10*3/mm3      RBC 5.31 10*6/mm3      Hemoglobin 14.5 g/dL      Hematocrit 43.5 %      MCV 81.9 fL      MCH 27.3 pg      MCHC 33.3 g/dL      RDW 12.8 %      RDW-SD 37.7 fl      MPV 9.3 fL      Platelets 385 10*3/mm3         Imaging Results (last 72 hours)     Procedure Component Value Units Date/Time    CT Angiogram Head With & Without Contrast [[id number]] Collected:  12/08/18 1516     Updated:  12/08/18 1529    Narrative:       EXAMINATION: CT angiogram of the brain with contrast 12/8/2018     DOSE: 201.5 mGycm. Automated exposure control was utilized to diminish  patient radiation dose..     HISTORY: Stroke     FINDINGS: Multiple contiguous axial images are obtained through the  [place] at 1 mm intervals following intravenous contrast  administration with reformatted images obtained in the sagittal and  coronal projections from the original data set. MIPS are also obtained.     There is attenuation of the size of the distal right vertebral artery.  The left vertebral artery is visualized distally but is also attenuated  and this may represent retrograde flow given the findings on the CT  angiogram of the neck of occlusion proximally with only intermittent  visualization of the left vertebral. The basilar artery is of very small  caliber. The superior cerebellar arteries are patent. Persistent fetal  origins provided the majority of flow to the posterior cerebral  arteries. There is a rudimentary P1 segment on the left.     The distal ICAs are widely patent. The anterior and middle cerebral  arteries are normal in caliber. No evidence of focal steno-occlusive  disease or discrete berry aneurysm.       Impression:       1.. The distal right vertebral artery is attenuated in caliber. The very  distal left vertebral artery is visualized but this may represent  retrograde flow with more proximal extensive disease within the left  vertebral.. The basilar  artery is also diminutive in size. The superior  cerebellar arteries demonstrate normal enhancement. There is a  rudimentary P1 segment of the left posterior cerebral artery. The  majority of flow to the posterior cerebral arteries is emanating from  the anterior circulation via persistent bilateral fetal origins.  2. The anterior circulation is unremarkable.  This report was finalized on 12/08/2018 15:26 by Dr. Will Johnston MD.    CT Angiogram Neck With & Without Contrast [[id number]] Collected:  12/08/18 1503     Updated:  12/08/18 1519    Narrative:       EXAMINATION: CT angiogram of the neck with contrast 12/8/2018     DOSE: 201.5 mGycm. Automated exposure control was utilized to diminish  patient radiation dose..     HISTORY: Stroke.     FINDINGS: Multiple contiguous axial images are obtained through the neck  from the aortic arch to the skull base at 1 mm intervals following  intravenous contrast administration with reformatted images obtained in  the sagittal and coronal projections from the original data set. MIPS  are also obtained. The lung apices are clear. The thyroid gland is  homogeneous in density. No evidence of adenopathy in the supraclavicular  fossa. No enlarged cervical chain lymphadenopathy is present. The  nasopharynx and parapharyngeal spaces are symmetric with no mass or mass  effect. The parotid and submandibular glands are normal in appearance.  The visualized paranasal sinuses are clear.     The aortic arch is normal in caliber. The origin of the great vessels  including the right vertebral artery are widely patent. The left  vertebral artery is occluded just distal to its origin. The lumen of the  vessel can be demonstrated and the enhancement present in the proximal  segment of the left vertebral is eccentric raising the possibility that  this may represent a vertebral artery dissection. The left vertebral  artery is demonstrated intermittently within the vertebral canal at  the  C4-C5 level and again at the C3 level. The distal extracranial aspect of  the vertebral artery and intracranial aspect of the left vertebral  artery is diseased and markedly attenuated. The distal right vertebral  artery is also attenuated in size. There is some flow within the very  distal aspect of the left vertebral artery intracranially perhaps  representing retrograde flow.     The carotid arteries are unremarkable including the common carotid  arteries, carotid bifurcations and extracranial ICAs with no focal  stenosis or plaquing.       Impression:       1.. 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 That  portion of the proximal vertebral artery which is visualized  demonstrates eccentric enhancement raising the possibility that this  represents a vertebral artery dissection. Left vertebral artery is noted  intermittently within the mid and upper cervical spine but is markedly  attenuated in its distal aspect including both the intra and  extracranial aspect. There is some enhancement of the very distal aspect  the left vertebral artery just proximal to the basilar I suspect this  may represent retrograde flow. There is also some attenuation of size of  the more distal right vertebral artery as well. The right vertebral  artery is otherwise patent.  2. The carotid arteries are unremarkable. This includes the carotid  bifurcations, common carotid arteries and ICAs.  This report was finalized on 12/08/2018 15:16 by Dr. Will Johnston MD.    MRI Brain With & Without Contrast [[id number]] Collected:  12/08/18 1000     Updated:  12/08/18 1017    Narrative:       MRI BRAIN W WO CONTRAST- 12/8/2018 9:14 AM CST     HISTORY: Stroke     COMPARISON: None.       TECHNIQUE: Multiplanar imaging of the brain was performed in a routine  fashion before and after the intravenous injection of gadolinium  contrast.     FINDINGS:   Diffusion: There is restricted diffusion with associated ADC  mapping  abnormality involving the left cerebellar tonsil, left vermis and  inferior left cerebellar hemisphere. On the lower most cut through the  right cerebellar tonsil there also appears to be some restricted  diffusion within the right tonsil. No other foci of restricted diffusion  are present.     Midline structures: Nondisplaced.     Ventricles: Normal in configuration and symmetric in size.     Masses: No masses or mass effect.     Basilar cisterns: Maintained.     Extra axial space: No abnormal extra-axial fluid.     Gray-white matter signal: There are scattered foci of T2 abnormality  involving the periventricular and higher white matter tracts. These are  nonspecific but are suspected to represent small vessel ischemic  disease. There is sulcal effacement and abnormal T2 signal within the  infarct distribution within the inferior left cerebellum. This infarct  is at least several days in age but less than 10 days in age.     Cerebellum: Previously described infarct in the left posterior inferior  cerebellar artery territory. Cerebellum is otherwise unremarkable.     Brainstem: Normal.     Enhancement: No abnormal enhancement.     Other: Proximal cervical spinal cord is normal. Bilateral globes and  orbits are normal in appearance. Normal cerebrovascular flow voids  noted. No abnormal signal in the mastoid air cells or paranasal sinuses.       Impression:       1. Restricted diffusion within the inferior left cerebellar hemisphere,  left cerebellar tonsil and vermis. On the lower most cut of today's  diffusion study there also appears to be some ischemia within the right  cerebellar tonsil although there is no associated abnormal T2 signal on  the more heavily T2 weighted sequences in this distribution. This  infarct is at least a few days in age with associated sulcal effacement  and edema. There is no significant mass effect or shift of the midline.  There is no associated abnormal contrast enhancement  or hemorrhage. This  infarct is within the posterior inferior cerebellar artery territory.  2. Scattered foci of T2 abnormality suggesting mild small vessel  disease. The brain is otherwise unremarkable.        This report was finalized on 12/08/2018 10:14 by Dr. Will Johnston MD.              Hospital Course  This is a 48-year-old female who was accepted from an outside hospital.  She presented with a several day history of mild headache, vertigo, and ataxia.  She had an abnormal head CT at the outside hospital and was transferred here.  An MRI the brain showed a midline cerebellar infarct.  CT angiography of the head and neck showed an occluded vertebral artery which may be chronic in nature.  The posterior circulation was fed by bilateral fetal PCA arteries.  Cardiac echo is performed on the day of discharge will be followed up.  Her lipid profile did show elevation of her LDL at 117 and she now has a goal of less than 70.  He will an A1c was unremarkable.  Routine lab work was normal.  Therapy service lines evaluated the patient and suggested that she had no therapy needs.  They cleared her to be discharged home.  She will be discharged home today.  Upon follow-up we will check to see whether she is tolerating her baby aspirin, high-dose statin, and follow-up on her hypercoagulable profile.  At that time we will also ensure that her cardiac echo showed no significant findings.  Next    Her neurologic exam on the day of discharge is that she is awake alert and oriented ×3 she has no nystagmus on examination.  She has no significant ataxia.  She can walk unassisted over 100 feet.      Discharge Disposition  Home or Self Care    Discharge Medications     Discharge Medications      New Medications      Instructions Start Date   aspirin 81 MG chewable tablet   81 mg, Oral, Daily      atorvastatin 80 MG tablet  Commonly known as:  LIPITOR   80 mg, Oral, Nightly         Continue These Medications      Instructions  Start Date   cetirizine 10 MG tablet  Commonly known as:  zyrTEC   10 mg, Oral, Daily      lisinopril 20 MG tablet  Commonly known as:  PRINIVIL,ZESTRIL   20 mg, Oral, Daily             Discharge Diet:   Regular diet  Activity at Discharge:   Slowly resume normal activity  Follow-up Appointments  F/U with neurology in 3      Test Results Pending at Discharge       Andrea Clark MD  12/09/18  9:10 AM    Time: Discharge 40 min

## 2018-12-09 NOTE — PLAN OF CARE
Problem: Stroke (Ischemic) (Adult)  Goal: Signs and Symptoms of Listed Potential Problems Will be Absent, Minimized or Managed (Stroke)  Outcome: Ongoing (interventions implemented as appropriate)      Problem: Fall Risk (Adult)  Goal: Identify Related Risk Factors and Signs and Symptoms  Outcome: Outcome(s) achieved Date Met: 12/09/18    Goal: Absence of Fall  Outcome: Ongoing (interventions implemented as appropriate)      Problem: Patient Care Overview  Goal: Plan of Care Review  Outcome: Ongoing (interventions implemented as appropriate)   12/09/18 0312   Coping/Psychosocial   Plan of Care Reviewed With patient   Plan of Care Review   Progress improving   OTHER   Outcome Summary Up ad [place] AAOX4. Plan is to go home today. Pleasant, safety maintained

## 2018-12-09 NOTE — PROGRESS NOTES
Discharge Planning Assessment  [facility]     Patient Name: Albina Lopez  MRN: [id number]  Today's Date: 12/9/2018    Admit Date: 12/7/2018    Discharge Needs Assessment     Row Name 12/09/18 1202       Living Environment    Lives With  sibling(s)    Current Living Arrangements  home/apartment/condo    Provides Primary Care For  no one    Family Caregiver if Needed  spouse    Quality of Family Relationships  involved;helpful;supportive    Able to Return to Prior Arrangements  yes       Resource/Environmental Concerns    Resource/Environmental Concerns  none       Transition Planning    Patient/Family Anticipates Transition to  home with family    Patient/Family Anticipated Services at Transition  none    Transportation Anticipated  family or friend will provide       Discharge Needs Assessment    Concerns to be Addressed  no discharge needs identified    Equipment Currently Used at Home  none    Discharge Coordination/Progress  Pt lives at home with her spouse and will go home at d/c today. She is independent with no needs identified at this time. Pt does have rx coverage.         Discharge Plan     Row Name 12/09/18 1204       Plan    Final Discharge Disposition Code  01 - home or self-care        Destination      No service coordination in this encounter.      Durable Medical Equipment      No service coordination in this encounter.      Dialysis/Infusion      No service coordination in this encounter.      Home Medical Care      No service coordination in this encounter.      Community Resources      No service coordination in this encounter.        Expected Discharge Date and Time     Expected Discharge Date Expected Discharge Time    Dec 9, 2018         Demographic Summary    No documentation.       Functional Status    No documentation.       Psychosocial    No documentation.       Abuse/Neglect    No documentation.       Legal    No documentation.       Substance Abuse    No documentation.       Patient  Forms    No documentation.           KAYLEY Alcocer

## 2018-12-09 NOTE — PLAN OF CARE
Problem: Stroke (Ischemic) (Adult)  Goal: Signs and Symptoms of Listed Potential Problems Will be Absent, Minimized or Managed (Stroke)  Outcome: Ongoing (interventions implemented as appropriate)      Problem: Fall Risk (Adult)  Goal: Absence of Fall  Outcome: Ongoing (interventions implemented as appropriate)      Problem: Patient Care Overview  Goal: Plan of Care Review  Outcome: Ongoing (interventions implemented as appropriate)   12/09/18 1228   Coping/Psychosocial   Plan of Care Reviewed With patient   Plan of Care Review   Progress improving   OTHER   Outcome Summary Pt a& o x4. No neuro changes. Denies any headache. Denies dizziness. Up ad [place]. VSS. Safety maintained. Awaiting discharge to home.

## 2018-12-10 ENCOUNTER — READMISSION MANAGEMENT (OUTPATIENT)
Dept: CALL CENTER | Facility: HOSPITAL | Age: 48
End: 2018-12-10

## 2018-12-11 ENCOUNTER — READMISSION MANAGEMENT (OUTPATIENT)
Dept: CALL CENTER | Facility: HOSPITAL | Age: 48
End: 2018-12-11

## 2018-12-11 LAB
CARDIOLIPIN IGG SER IA-ACNC: <9 GPL U/ML (ref 0–14)
CARDIOLIPIN IGM SER IA-ACNC: <9 MPL U/ML (ref 0–12)
HCYS SERPL-SCNC: 10.5 UMOL/L (ref 0–15)
LA NT DPL PPP: 51.6 SEC (ref 0–55)
LA NT DPL/LA NT HPL PPP-RTO: 1.07 RATIO (ref 0–1.4)
LA NT PLATELET PPP: 30.8 SEC (ref 0–51.9)
LUPUS ANTICOAGULANT REFLEX: NORMAL
SCREEN DRVVT: 39.2 SEC (ref 0–47)
THROMBIN TIME: 16.3 SEC (ref 0–23)

## 2018-12-11 NOTE — OUTREACH NOTE
Stroke Week 1 Survey      Responses   Facility patient discharged from?  [facility]   Does the patient have one of the following disease processes/diagnoses(primary or secondary)?  Stroke (TIA)   Is there a successful TCM telephone encounter documented?  No   Week 1 attempt successful?  Yes   Call start time  1242   Call end time  1246   General alerts for this patient  back to baseline except has nto driven yet   Discharge diagnosis  acute CVA   Is patient permission given to speak with other caregiver?  Yes   List who call center can speak with  ashkan Hollis   Meds reviewed with patient/caregiver?  Yes   Is the patient having any side effects they believe may be caused by any medication additions or changes?  No   Does the patient have all medications ordered at discharge?  Yes   Is the patient taking all medications as directed (includes completed medication regime)?  Yes   Comments regarding appointments  12/18 appt. set    Does the patient have a primary care provider?   Yes   Does the patient have an appointment with their PCP within 7 days of discharge?  Yes   Comments regarding PCP  Dr. Rea pcp at home.    Has the patient kept scheduled appointments due by today?  N/A   Has home health visited the patient within 72 hours of discharge?  N/A   Has all DME been delivered?  No   Psychosocial issues?  No   Does the patient require any assistance with activities of daily living such as eating, bathing, dressing, walking, etc.?  No   ADL comments  not driving yet   Does the patient have any residual symptoms from stroke/TIA?  No   Does the patient understand the diet ordered at discharge?  Yes   Comments  some dizziness when turning head rapid   Did the patient receive a copy of their discharge instructions?  Yes   Nursing interventions  Reviewed instructions with patient, Educated on MyChart   What is the patient's perception of their health status since discharge?  Improving   Nursing interventions  Nurse  provided patient education   Is the patient able to teach back FAST for Stroke?  Yes   Is the patient/caregiver able to teach back the risk factors for a stroke?  High blood pressure-goal below 120/80   Is the patient/caregiver able to teach back signs and symptoms related to disease process for when to call PCP?  Yes   Is the patient/caregiver able to teach back signs and symptoms related to disease process for when to call 911?  Yes   Is the patient/caregiver able to teach back the hierarchy of who to call/visit for symptoms/problems? PCP, Specialist, Home health nurse, Urgent Care, ED, 911  Yes   Additional teach back comments  states feeling well   Week 1 call completed?  Yes          Ludmila Pal, RN

## 2018-12-11 NOTE — PAYOR COMM NOTE
"FROM: DEJA FERNANDEZ  PHONE: [phone]  FAX: [phone]    [id number]    Cristofer Hooks (48 y.o. Female)     Date of Birth Social Security Number Address Home Phone MRN    1970  [address] [place] [id number] [phone] [id number]    Amish Marital Status          Other        Admission Date Admission Type Admitting Provider Attending Provider Department, Room/Bed    18 Urgent Andrea Clark MD  [facility] 3A, 357/1    Discharge Date Discharge Disposition Discharge Destination        2018 Home or Self Care              Attending Provider:  (none)   Allergies:  No Known Allergies    Isolation:  None   Infection:  None   Code Status:  Prior    Ht:  167.6 cm (66\")   Wt:  71.1 kg (156 lb 12 oz)    Admission Cmt:  None   Principal Problem:  None                Active Insurance as of 2018     Primary Coverage     Payor Plan Insurance Group Employer/Plan Group    [employer] [facility] [facility] [id number] [facility] PPO [id number]     Payor Plan Address Payor Plan Phone Number Payor Plan Fax Number Effective Dates    [address] [phone]  2017 - None Entered    [place] [id number]       Subscriber Name Subscriber Birth Date Member ID       CRISTOFER HOOKS 1970 [id number]                 Emergency Contacts      (Rel.) Home Phone Work Phone Mobile Phone    noel hooks (Spouse) [phone] -- --               History & Physical      Andrea Clark MD at 2018  1:34 PM          Neurology History & Physical    Indication for Admission: stroke    History of present illness:      48-year-old female with no significant medical history other than hypertension who on Tuesday of this week had a sudden onset of some mild headache, vertigo, right arm and leg discrimination.  This waxed and waned over the next 3 days.  She did notice some difficulties with swallowing as well.  On Friday she found visited her primary care " physician who sent her to the local emergency department in [facility].  There head CT showed concern for cerebellar stroke.  She was accepted in transfer here.  This morning she feels better.  She is tolerating a by mouth diet well.  She has some mild residual vertigo but otherwise has no complaints of ataxia.  She denies any weakness or numbness.  She denies a headache currently.  She has no family history of hypercoagulable states.    No past medical history on file.  Past mental history is hypertension only.  No Known Allergies  Medications Prior to Admission   Medication Sig Dispense Refill Last Dose   • cetirizine (zyrTEC) 10 MG tablet Take 10 mg by mouth Daily.   12/7/2018 at Unknown time   • lisinopril (PRINIVIL,ZESTRIL) 20 MG tablet Take 20 mg by mouth Daily.   12/7/2018 at Unknown time       Social History     Socioeconomic History   • Marital status:      Spouse name: Not on file   • Number of children: Not on file   • Years of education: Not on file   • Highest education level: Not on file   Social Needs   • Financial resource strain: Not on file   • Food insecurity - worry: Not on file   • Food insecurity - inability: Not on file   • Transportation needs - medical: Not on file   • Transportation needs - non-medical: Not on file   Occupational History   • Not on file   Tobacco Use   • Smoking status: Former Smoker   Substance and Sexual Activity   • Alcohol use: Not on file   • Drug use: Not on file   • Sexual activity: Not on file   Other Topics Concern   • Not on file   Social History Narrative   • Not on file   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  She does not use out all tobacco or drugs.  She is  and her  is at the bedside  No family history on file.  Her mother had a heart attack in her 60s  Review of Systems  Review of Systems - a 14 point review of systems was performed was positive only for ataxia.    Vital Signs   Temp:  [97.3 °F (36.3 °C)-99.1 °F (37.3 °C)] 99.1  °F (37.3 °C)  Heart Rate:  [71-99] 99  Resp:  [16-18] 16  BP: ()/(51-79) 98/56    General Exam:  Head:  Normal cephalic, atraumatic  HEENT:  Neck supple  Fundoscopic Exam:  No signs of disc edema  CVS:  Regular rate and rhythm.  No murmurs  Carotid Examination:  No bruits  Lungs:  Clear to auscultation  Abdomen:  Non-tender, Non-distended  Extremities:  No signs of peripheral edema  Skin:  No rashes    Neurologic Exam:    Mental Status:    -Awake, Alert, Oriented X 3  -No word finding difficulties  -No aphasia  -No dysarthria  -Follows simple and complex commands    CN II:  Visual fields full.  Pupils equally reactive to light  CN III, IV, VI:  Extraocular Muscles full with no signs of nystagmus  CN V:  Facial sensory is symmetric with no asymmetries.  CN VII:  Facial motor symmetric  CN VIII:  Gross hearing intact bilaterally  CN IX:  Palate elevates symmetrically  CN X:  Palate elevates symmetrically  CN XI:  Shoulder shrug symmetric  CN XII:  Tongue is midline on protrusion    Motor: (strength out of 5:  1= minimal movement, 2 = movement in plane of gravity, 3 = movement against gravity, 4 = movement against some resistance, 5 = full strength)    -Right Upper Ext: Proximal: 5 Distal: 5  -Left Upper Ext: Proximal: 5 Distal: 5    -Right Lower Ext: Proximal: 5 Distal: 5  -Left Lower Ext: Proximal: 5 Distal: 5    DTR:  -Right   Bicep: 2+ Tricep: 2+ Brachoradialis: 2+   Patella: 2+ Ankle: 2+ Neg Babinski  -Left   Bicep: 2+ Tricep: 2+ Brachoradialis: 2+   Patella: 2+ Ankle: 2+ Neg Babinski    Sensory:  -Intact to light touch, pinprick, temperature, pain, and proprioception    Coordination:  -Finger to nose intact  -Heel to shin intact  -No ataxia    Gait  -No signs of ataxia  -ambulates unassisted      Results Review:  Lab Results (last 7 days)     Procedure Component Value Units Date/Time    POC Glucose Once [[id number]]  (Abnormal) Collected:  12/08/18 1201    Specimen:  Blood Updated:  12/08/18 1212     Glucose  140 mg/dL      Comment: : [id number] Sagar CollazoMeter ID: [id number]       Hemoglobin A1c [[id number]] Collected:  12/08/18 0357    Specimen:  Blood Updated:  12/08/18 0616     Hemoglobin A1C 4.9 %     Narrative:       Less than 6.0           Non-Diabetic Range  6.0-7.0                 ADA Therapeutic Target  Greater than 7.0        Action Suggested    Lipid Panel [[id number]]  (Abnormal) Collected:  12/08/18 0357    Specimen:  Blood Updated:  12/08/18 0536     Total Cholesterol 166 mg/dL      Triglycerides 122 mg/dL      HDL Cholesterol 37 mg/dL      LDL Cholesterol  117 mg/dL      LDL/HDL Ratio 2.83    Comprehensive Metabolic Panel [[id number]] Collected:  12/08/18 0357    Specimen:  Blood Updated:  12/08/18 0525     Glucose 85 mg/dL      BUN 18 mg/dL      Creatinine 0.78 mg/dL      Sodium 140 mmol/L      Potassium 4.5 mmol/L      Chloride 103 mmol/L      CO2 25.0 mmol/L      Calcium 9.3 mg/dL      Total Protein 6.4 g/dL      Albumin 3.80 g/dL      ALT (SGPT) 28 U/L      AST (SGOT) 21 U/L      Alkaline Phosphatase 51 U/L      Total Bilirubin 0.7 mg/dL      eGFR Non African Amer 78 mL/min/1.73      Globulin 2.6 gm/dL      A/G Ratio 1.5 g/dL      BUN/Creatinine Ratio 23.1     Anion Gap 12.0 mmol/L     CBC (No Diff) [[id number]]  (Abnormal) Collected:  12/08/18 0357    Specimen:  Blood Updated:  12/08/18 0514     WBC 8.65 10*3/mm3      RBC 5.08 10*6/mm3      Hemoglobin 13.8 g/dL      Hematocrit 41.9 %      MCV 82.5 fL      MCH 27.2 pg      MCHC 32.9 g/dL      RDW 12.7 %      RDW-SD 38.0 fl      MPV 9.6 fL      Platelets 364 10*3/mm3     Comprehensive Metabolic Panel [[id number]] Collected:  12/07/18 2050    Specimen:  Blood Updated:  12/07/18 2113     Glucose 88 mg/dL      BUN 13 mg/dL      Creatinine 0.70 mg/dL      Sodium 140 mmol/L      Potassium 4.3 mmol/L      Chloride 101 mmol/L      CO2 27.0 mmol/L      Calcium 9.4 mg/dL      Total Protein 7.8 g/dL      Albumin 4.30 g/dL      ALT (SGPT) 22 U/L      AST  (SGOT) 22 U/L      Alkaline Phosphatase 66 U/L      Total Bilirubin 0.8 mg/dL      eGFR Non African Amer 89 mL/min/1.73      Globulin 3.5 gm/dL      A/G Ratio 1.2 g/dL      BUN/Creatinine Ratio 18.6     Anion Gap 12.0 mmol/L     CBC (No Diff) [[id number]]  (Abnormal) Collected:  12/07/18 2050    Specimen:  Blood Updated:  12/07/18 2103     WBC 9.65 10*3/mm3      RBC 5.31 10*6/mm3      Hemoglobin 14.5 g/dL      Hematocrit 43.5 %      MCV 81.9 fL      MCH 27.3 pg      MCHC 33.3 g/dL      RDW 12.8 %      RDW-SD 37.7 fl      MPV 9.3 fL      Platelets 385 10*3/mm3         Patient Active Problem List   Diagnosis   • Stroke (CMS/HCC)         MRI of brain reviewed by me shows left paramedian cerebellar stroke in the vermis  Impression:    1.  Acute cerebellar infarct cryptogenic in nature  2.  Hypertension    Plan:    · No tPA secondary presenting outside of the 4.5 hour window  · continue aspirin 81 mg  · Continue Lipitor 80 mg daily  · Cardiac echo pending  · CT angiography of the head and neck  · Therapy consultations  · SCDs for DVT prophylaxis  · Hypercoagulable panel    Andrea Clark MD  12/08/18  1:34 PM      Electronically signed by Andrea Clark MD at 12/8/2018  1:40 PM       Emergency Department Notes     No notes of this type exist for this encounter.        ICU Vital Signs     Row Name 12/09/18 1104 12/09/18 0825 12/09/18 0718 12/09/18 0448 12/09/18 0052       Vitals    Temp  98.8 °F (37.1 °C)  --  99.1 °F (37.3 °C)  97.9 °F (36.6 °C)  98 °F (36.7 °C)    Temp src  Tympanic  --  Tympanic  Oral  Oral    Pulse  82  --  76  79  83    Heart Rate Source  Monitor  --  Monitor  Monitor  Monitor    Resp  16  --  16  16  16    Resp Rate Source  Visual  --  Visual  Visual  Visual    BP  119/71  --  111/74  105/61  110/65    BP Location  Right arm  --  Right arm  Left arm  Left arm    BP Method  Automatic  --  Automatic  Automatic  Automatic    Patient Position  Lying  --  Lying  Lying  Lying       Oxygen Therapy  "   SpO2  96 %  --  97 %  98 %  96 %    Pulse Oximetry Type  Intermittent  --  Intermittent  --  --    Device (Oxygen Therapy)  room air  room air  room air  room air  room air    Row Name 12/08/18 2017 12/08/18 2000 12/08/18 1527 12/08/18 1210 12/08/18 0300       Vitals    Temp  98.2 °F (36.8 °C)  --  98.5 °F (36.9 °C)  99.1 °F (37.3 °C)  97.9 °F (36.6 °C)    Temp src  Oral  --  Tympanic  Tympanic  Oral    Pulse  91  --  80  99  71    Heart Rate Source  Monitor  --  Monitor  Monitor  Monitor    Resp  16  --  16  16  18    Resp Rate Source  Visual  --  Visual  Visual  Visual    BP  110/61  --  111/57  98/56  92/51    BP Location  Left arm  --  Left arm  Left arm  Right arm    BP Method  Automatic  --  Automatic  Automatic  Manual    Patient Position  Lying  --  Lying  Lying  Lying       Oxygen Therapy    SpO2  97 %  --  99 %  97 %  97 %    Pulse Oximetry Type  --  --  Intermittent  Intermittent  Intermittent    Device (Oxygen Therapy)  room air  room air  room air  room air  room air    Row Name 12/08/18 0055 12/07/18 2000                Height and Weight    Height  --  167.6 cm (66\")       Height Method  --  Actual       Weight  --  71.1 kg (156 lb 12 oz)       Weight Method  --  Standing scale       Ideal Body Weight (IBW) (kg)  --  59.58       BSA (Calculated - sq m)  --  1.8 sq meters       BMI (Calculated)  --  25.3       Weight in (lb) to have BMI = 25  --  154.6          Vitals    Temp  98.1 °F (36.7 °C)  97.3 °F (36.3 °C)       Temp src  Oral  Oral       Pulse  74  72       Heart Rate Source  Monitor  Monitor       Resp  18  18       Resp Rate Source  Visual  Visual       BP  98/55  142/79       BP Location  Right arm  Left arm       BP Method  Automatic  Automatic       Patient Position  Lying  Lying          Oxygen Therapy    SpO2  96 %  98 %       Pulse Oximetry Type  Intermittent  Intermittent       Device (Oxygen Therapy)  room air  room air           Hospital Medications (all)       Dose Frequency Start " "End    gadobenate dimeglumine (MULTIHANCE) injection 10 mL 10 mL Once in Imaging 12/8/2018 12/8/2018    Sig - Route: Infuse 10 mL into a venous catheter Once. - Intravenous    iopamidol (ISOVUE-370) 76 % injection 125 mL 125 mL Once in Imaging 12/8/2018 12/8/2018    Sig - Route: Infuse 125 mL into a venous catheter Once. - Intravenous    acetaminophen (TYLENOL) suppository 650 mg (Discontinued) 650 mg Every 4 Hours PRN 12/7/2018 12/9/2018    Sig - Route: Insert 1 suppository into the rectum Every 4 (Four) Hours As Needed for Mild Pain  or Fever (Temperature greater than or equal to 37 C). - Rectal    Reason for Discontinue: Patient Discharge    Linked Group 1:  \"Or\" Linked Group Details        acetaminophen (TYLENOL) tablet 650 mg (Discontinued) 650 mg Every 4 Hours PRN 12/7/2018 12/9/2018    Sig - Route: Take 2 tablets by mouth Every 4 (Four) Hours As Needed for Mild Pain  or Fever (Temperature greater than or equal to 37 C). - Oral    Reason for Discontinue: Patient Discharge    Linked Group 1:  \"Or\" Linked Group Details        aspirin chewable tablet 81 mg (Discontinued) 81 mg Daily 12/9/2018 12/9/2018    Sig - Route: Chew 1 tablet Daily. - Oral    Reason for Discontinue: Patient Discharge    aspirin suppository 300 mg (Discontinued) 300 mg Daily 12/8/2018 12/8/2018    Sig - Route: Insert 1 suppository into the rectum Daily. - Rectal    Linked Group 2:  \"Or\" Linked Group Details        aspirin tablet 325 mg (Discontinued) 325 mg Daily 12/8/2018 12/8/2018    Sig - Route: Take 1 tablet by mouth Daily. - Oral    Linked Group 2:  \"Or\" Linked Group Details        atorvastatin (LIPITOR) tablet 80 mg (Discontinued) 80 mg Nightly 12/7/2018 12/9/2018    Sig - Route: Take 2 tablets by mouth Every Night. - Oral    Reason for Discontinue: Patient Discharge    ondansetron (ZOFRAN) injection 4 mg (Discontinued) 4 mg Every 6 Hours PRN 12/7/2018 12/9/2018    Sig - Route: Infuse 2 mL into a venous catheter Every 6 (Six) Hours As " Needed for Nausea or Vomiting. - Intravenous    Reason for Discontinue: Patient Discharge    sodium chloride 0.9 % flush 3 mL (Discontinued) 3 mL Every 12 Hours Scheduled 12/7/2018 12/9/2018    Sig - Route: Infuse 3 mL into a venous catheter Every 12 (Twelve) Hours. - Intravenous    Reason for Discontinue: Patient Discharge    sodium chloride 0.9 % flush 3-10 mL (Discontinued) 3-10 mL As Needed 12/7/2018 12/9/2018    Sig - Route: Infuse 3-10 mL into a venous catheter As Needed for Line Care. - Intravenous    Reason for Discontinue: Patient Discharge          Lab Results (last 7 days)     Procedure Component Value Units Date/Time    Beta-2 Glycoprotein Antibodies [[id number]] Collected:  12/09/18 1147    Specimen:  Blood Updated:  12/09/18 1155    Antiphosphotidyl Antibodies Panel II [[id number]] Collected:  12/09/18 1147    Specimen:  Blood Updated:  12/09/18 1155    Phosphatidylserine Antibodies [[id number]] Collected:  12/09/18 1147    Specimen:  Blood Updated:  12/09/18 1154    Factor 5 Leiden [[id number]] Collected:  12/09/18 1147    Specimen:  Blood Updated:  12/09/18 1154    Lupus Anticoagulant [[id number]] Collected:  12/09/18 1147    Specimen:  Blood Updated:  12/09/18 1154    Protein C Activity [[id number]] Collected:  12/09/18 1147    Specimen:  Blood Updated:  12/09/18 1153    Comprehensive Metabolic Panel [[id number]] Collected:  12/09/18 0421    Specimen:  Blood Updated:  12/09/18 0551     Glucose 87 mg/dL      BUN 14 mg/dL      Creatinine 0.68 mg/dL      Sodium 138 mmol/L      Potassium 4.3 mmol/L      Chloride 101 mmol/L      CO2 24.0 mmol/L      Calcium 9.2 mg/dL      Total Protein 6.5 g/dL      Albumin 3.60 g/dL      ALT (SGPT) 25 U/L      AST (SGOT) 17 U/L      Alkaline Phosphatase 51 U/L      Total Bilirubin 0.6 mg/dL      eGFR Non African Amer 92 mL/min/1.73      Globulin 2.9 gm/dL      A/G Ratio 1.2 g/dL      BUN/Creatinine Ratio 20.6     Anion Gap 13.0 mmol/L     CBC (No Diff) [[id number]]  (Abnormal)  Collected:  12/09/18 0421    Specimen:  Blood Updated:  12/09/18 0538     WBC 9.73 10*3/mm3      RBC 4.88 10*6/mm3      Hemoglobin 13.5 g/dL      Hematocrit 40.9 %      MCV 83.8 fL      MCH 27.7 pg      MCHC 33.0 g/dL      RDW 12.7 %      RDW-SD 38.5 fl      MPV 9.7 fL      Platelets 347 10*3/mm3     POC Glucose Once [[id number]]  (Normal) Collected:  12/08/18 1718    Specimen:  Blood Updated:  12/08/18 1732     Glucose 82 mg/dL      Comment: : [id number] Keith NicoleMeter ID: [id number]       POC Glucose Once [[id number]]  (Abnormal) Collected:  12/08/18 1201    Specimen:  Blood Updated:  12/08/18 1212     Glucose 140 mg/dL      Comment: : [id number] Keith NicoleMeter ID: [id number]       Hemoglobin A1c [[id number]] Collected:  12/08/18 0357    Specimen:  Blood Updated:  12/08/18 0616     Hemoglobin A1C 4.9 %     Narrative:       Less than 6.0           Non-Diabetic Range  6.0-7.0                 ADA Therapeutic Target  Greater than 7.0        Action Suggested    Lipid Panel [[id number]]  (Abnormal) Collected:  12/08/18 0357    Specimen:  Blood Updated:  12/08/18 0536     Total Cholesterol 166 mg/dL      Triglycerides 122 mg/dL      HDL Cholesterol 37 mg/dL      LDL Cholesterol  117 mg/dL      LDL/HDL Ratio 2.83    Comprehensive Metabolic Panel [[id number]] Collected:  12/08/18 0357    Specimen:  Blood Updated:  12/08/18 0525     Glucose 85 mg/dL      BUN 18 mg/dL      Creatinine 0.78 mg/dL      Sodium 140 mmol/L      Potassium 4.5 mmol/L      Chloride 103 mmol/L      CO2 25.0 mmol/L      Calcium 9.3 mg/dL      Total Protein 6.4 g/dL      Albumin 3.80 g/dL      ALT (SGPT) 28 U/L      AST (SGOT) 21 U/L      Alkaline Phosphatase 51 U/L      Total Bilirubin 0.7 mg/dL      eGFR Non African Amer 78 mL/min/1.73      Globulin 2.6 gm/dL      A/G Ratio 1.5 g/dL      BUN/Creatinine Ratio 23.1     Anion Gap 12.0 mmol/L     CBC (No Diff) [[id number]]  (Abnormal) Collected:  12/08/18 0357    Specimen:  Blood  Updated:  12/08/18 0514     WBC 8.65 10*3/mm3      RBC 5.08 10*6/mm3      Hemoglobin 13.8 g/dL      Hematocrit 41.9 %      MCV 82.5 fL      MCH 27.2 pg      MCHC 32.9 g/dL      RDW 12.7 %      RDW-SD 38.0 fl      MPV 9.6 fL      Platelets 364 10*3/mm3     Comprehensive Metabolic Panel [[id number]] Collected:  12/07/18 2050    Specimen:  Blood Updated:  12/07/18 2113     Glucose 88 mg/dL      BUN 13 mg/dL      Creatinine 0.70 mg/dL      Sodium 140 mmol/L      Potassium 4.3 mmol/L      Chloride 101 mmol/L      CO2 27.0 mmol/L      Calcium 9.4 mg/dL      Total Protein 7.8 g/dL      Albumin 4.30 g/dL      ALT (SGPT) 22 U/L      AST (SGOT) 22 U/L      Alkaline Phosphatase 66 U/L      Total Bilirubin 0.8 mg/dL      eGFR Non African Amer 89 mL/min/1.73      Globulin 3.5 gm/dL      A/G Ratio 1.2 g/dL      BUN/Creatinine Ratio 18.6     Anion Gap 12.0 mmol/L     CBC (No Diff) [[id number]]  (Abnormal) Collected:  12/07/18 2050    Specimen:  Blood Updated:  12/07/18 2103     WBC 9.65 10*3/mm3      RBC 5.31 10*6/mm3      Hemoglobin 14.5 g/dL      Hematocrit 43.5 %      MCV 81.9 fL      MCH 27.3 pg      MCHC 33.3 g/dL      RDW 12.8 %      RDW-SD 37.7 fl      MPV 9.3 fL      Platelets 385 10*3/mm3           Imaging Results (last 7 days)     Procedure Component Value Units Date/Time    US Carotid Bilateral [[id number]] Collected:  12/09/18 1101     Updated:  12/09/18 1106    Narrative:       History: Carotid occlusive disease       Impression:       Impression:  1. There is less than 50% stenosis of the right internal carotid artery.  2. There is 50-69% stenosis of the left internal carotid artery.  3. Antegrade flow is demonstrated in the right vertebral artery. The  left vertebral artery was unable to be visualized.     Comments: Bilateral carotid vertebral arterial duplex scan was  performed.     Grayscale imaging shows intimal thickening and calcified elements at the  carotid bifurcation. The right internal carotid artery peak  systolic  velocity is 119 cm/sec. The end-diastolic velocity is 51.9 cm/sec. The  right ICA/CCA ratio is approximately 0.92 . These findings correlate  with less than 50% stenosis of the right internal carotid artery.     Grayscale imaging shows intimal thickening and calcified elements at the  carotid bifurcation. The left internal carotid artery peak systolic  velocity is 156 cm/sec. The end-diastolic velocity is 58.1 cm/sec. The  left ICA/CCA ratio is approximately 1.05 . These findings correlate with  50-69% stenosis of the left internal carotid artery.     Antegrade flow is demonstrated in the right vertebral artery. Left  vertebral artery was viable to be visualized.  Greater than 50% stenosis of the bilateral common carotid arteries.  Greater than 50% stenosis of the proximal right external carotid artery.  This report was finalized on 12/09/2018 11:03 by Dr. Alfred Bustamante MD.    CT Angiogram Head With & Without Contrast [[id number]] Collected:  12/08/18 1516     Updated:  12/08/18 1529    Narrative:       EXAMINATION: CT angiogram of the brain with contrast 12/8/2018     DOSE: 201.5 mGycm. Automated exposure control was utilized to diminish  patient radiation dose..     HISTORY: Stroke     FINDINGS: Multiple contiguous axial images are obtained through the  [place] at 1 mm intervals following intravenous contrast  administration with reformatted images obtained in the sagittal and  coronal projections from the original data set. MIPS are also obtained.     There is attenuation of the size of the distal right vertebral artery.  The left vertebral artery is visualized distally but is also attenuated  and this may represent retrograde flow given the findings on the CT  angiogram of the neck of occlusion proximally with only intermittent  visualization of the left vertebral. The basilar artery is of very small  caliber. The superior cerebellar arteries are patent. Persistent fetal  origins provided the  majority of flow to the posterior cerebral  arteries. There is a rudimentary P1 segment on the left.     The distal ICAs are widely patent. The anterior and middle cerebral  arteries are normal in caliber. No evidence of focal steno-occlusive  disease or discrete berry aneurysm.       Impression:       1.. The distal right vertebral artery is attenuated in caliber. The very  distal left vertebral artery is visualized but this may represent  retrograde flow with more proximal extensive disease within the left  vertebral.. The basilar artery is also diminutive in size. The superior  cerebellar arteries demonstrate normal enhancement. There is a  rudimentary P1 segment of the left posterior cerebral artery. The  majority of flow to the posterior cerebral arteries is emanating from  the anterior circulation via persistent bilateral fetal origins.  2. The anterior circulation is unremarkable.  This report was finalized on 12/08/2018 15:26 by Dr. Will Johnston MD.    CT Angiogram Neck With & Without Contrast [[id number]] Collected:  12/08/18 1503     Updated:  12/08/18 1519    Narrative:       EXAMINATION: CT angiogram of the neck with contrast 12/8/2018     DOSE: 201.5 mGycm. Automated exposure control was utilized to diminish  patient radiation dose..     HISTORY: Stroke.     FINDINGS: Multiple contiguous axial images are obtained through the neck  from the aortic arch to the skull base at 1 mm intervals following  intravenous contrast administration with reformatted images obtained in  the sagittal and coronal projections from the original data set. MIPS  are also obtained. The lung apices are clear. The thyroid gland is  homogeneous in density. No evidence of adenopathy in the supraclavicular  fossa. No enlarged cervical chain lymphadenopathy is present. The  nasopharynx and parapharyngeal spaces are symmetric with no mass or mass  effect. The parotid and submandibular glands are normal in appearance.  The  visualized paranasal sinuses are clear.     The aortic arch is normal in caliber. The origin of the great vessels  including the right vertebral artery are widely patent. The left  vertebral artery is occluded just distal to its origin. The lumen of the  vessel can be demonstrated and the enhancement present in the proximal  segment of the left vertebral is eccentric raising the possibility that  this may represent a vertebral artery dissection. The left vertebral  artery is demonstrated intermittently within the vertebral canal at the  C4-C5 level and again at the C3 level. The distal extracranial aspect of  the vertebral artery and intracranial aspect of the left vertebral  artery is diseased and markedly attenuated. The distal right vertebral  artery is also attenuated in size. There is some flow within the very  distal aspect of the left vertebral artery intracranially perhaps  representing retrograde flow.     The carotid arteries are unremarkable including the common carotid  arteries, carotid bifurcations and extracranial ICAs with no focal  stenosis or plaquing.       Impression:       1.. 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 That  portion of the proximal vertebral artery which is visualized  demonstrates eccentric enhancement raising the possibility that this  represents a vertebral artery dissection. Left vertebral artery is noted  intermittently within the mid and upper cervical spine but is markedly  attenuated in its distal aspect including both the intra and  extracranial aspect. There is some enhancement of the very distal aspect  the left vertebral artery just proximal to the basilar I suspect this  may represent retrograde flow. There is also some attenuation of size of  the more distal right vertebral artery as well. The right vertebral  artery is otherwise patent.  2. The carotid arteries are unremarkable. This includes the carotid  bifurcations, common carotid arteries and  ICAs.  This report was finalized on 12/08/2018 15:16 by Dr. Will Johnston MD.    MRI Brain With & Without Contrast [[id number]] Collected:  12/08/18 1000     Updated:  12/08/18 1017    Narrative:       MRI BRAIN W WO CONTRAST- 12/8/2018 9:14 AM CST     HISTORY: Stroke     COMPARISON: None.       TECHNIQUE: Multiplanar imaging of the brain was performed in a routine  fashion before and after the intravenous injection of gadolinium  contrast.     FINDINGS:   Diffusion: There is restricted diffusion with associated ADC mapping  abnormality involving the left cerebellar tonsil, left vermis and  inferior left cerebellar hemisphere. On the lower most cut through the  right cerebellar tonsil there also appears to be some restricted  diffusion within the right tonsil. No other foci of restricted diffusion  are present.     Midline structures: Nondisplaced.     Ventricles: Normal in configuration and symmetric in size.     Masses: No masses or mass effect.     Basilar cisterns: Maintained.     Extra axial space: No abnormal extra-axial fluid.     Gray-white matter signal: There are scattered foci of T2 abnormality  involving the periventricular and higher white matter tracts. These are  nonspecific but are suspected to represent small vessel ischemic  disease. There is sulcal effacement and abnormal T2 signal within the  infarct distribution within the inferior left cerebellum. This infarct  is at least several days in age but less than 10 days in age.     Cerebellum: Previously described infarct in the left posterior inferior  cerebellar artery territory. Cerebellum is otherwise unremarkable.     Brainstem: Normal.     Enhancement: No abnormal enhancement.     Other: Proximal cervical spinal cord is normal. Bilateral globes and  orbits are normal in appearance. Normal cerebrovascular flow voids  noted. No abnormal signal in the mastoid air cells or paranasal sinuses.       Impression:       1. Restricted diffusion within  the inferior left cerebellar hemisphere,  left cerebellar tonsil and vermis. On the lower most cut of today's  diffusion study there also appears to be some ischemia within the right  cerebellar tonsil although there is no associated abnormal T2 signal on  the more heavily T2 weighted sequences in this distribution. This  infarct is at least a few days in age with associated sulcal effacement  and edema. There is no significant mass effect or shift of the midline.  There is no associated abnormal contrast enhancement or hemorrhage. This  infarct is within the posterior inferior cerebellar artery territory.  2. Scattered foci of T2 abnormality suggesting mild small vessel  disease. The brain is otherwise unremarkable.        This report was finalized on 12/08/2018 10:14 by Dr. Will Johnston MD.        ECG/EMG Results (last 7 days)     ** No results found for the last 168 hours. **        Orders (last 7 days)     Start     Ordered    12/10/18 0600  Antiphosphotidyl Antibodies Panel II  Morning Draw,   Status:  Canceled      12/08/18 1445    12/10/18 0600  Antithrombin III  Morning Draw,   Status:  Canceled      12/08/18 1445    12/10/18 0600  Beta-2 Glycoprotein Antibodies  Morning Draw,   Status:  Canceled      12/08/18 1445    12/10/18 0600  Factor 5 Leiden  Morning Draw,   Status:  Canceled      12/08/18 1445    12/10/18 0600  Homocysteine  Morning Draw,   Status:  Canceled      12/08/18 1445    12/10/18 0600  Lupus Anticoagulant  Morning Draw,   Status:  Canceled      12/08/18 1445    12/10/18 0600  Phosphatidylserine Antibodies  Morning Draw,   Status:  Canceled      12/08/18 1445    12/10/18 0600  Protein C Activity  Morning Draw,   Status:  Canceled      12/08/18 1445    12/10/18 0600  Anticardiolipin Antibody, IgG / M, Qn  Morning Draw,   Status:  Canceled      12/08/18 1757    12/10/18 0000  aspirin 81 MG chewable tablet  Daily      12/09/18 0909    12/09/18 0935  Factor 5 Leiden  Once      12/09/18 0934     18 0935  Homocysteine  Once      18 0934    18 0935  Lupus Anticoagulant  Once      18 0934    18 0935  Phosphatidylserine Antibodies  Once      18 0934    18 0935  Protein C Activity  Once      18 0934    18 0934  Anticardiolipin Antibody, IgG / M, Qn  Once      18 0934    18 0934  Antiphosphotidyl Antibodies Panel II  Once      18 0934    18 0934  Antithrombin III  Once      18 0934    18 0934  Beta-2 Glycoprotein Antibodies  Once      18 0934    18 0909  Adult Transthoracic Echo Complete W/ Cont if Necessary Per Protocol (With Agitated Saline)  Once     Comments:  With bubble study    18 0908    18 0909  Discharge patient  Once      18 0909    18 0900  aspirin chewable tablet 81 mg  Daily,   Status:  Discontinued      18 1340    18 0600  Anticardiolipin Antibody, IgG / M, Qn  Morning Draw,   Status:  Canceled      18 1445    18 0000  atorvastatin (LIPITOR) 80 MG tablet  Nightly      18 0909    18 1733  POC Glucose Once  Once      18 1718    18 1558  PT Plan of Care Cert / Re-Cert  Once     Comments:  Physical Therapy Plan of Care  Initial Certification  Certification Period: 2018 - 3/8/2019    Patient Name: Albina Lopez  : 1970    (I63.442) Cerebrovascular accident (CVA) due to embolism of left cerebellar artery (CMS/[facility])                  Assessment                     Plan  PT Plan  Therapy Frequency (PT Clinical Impression): evaluation only  Outcome Summary: PT IE complete.  Pt independent w/ mobility.  No skilled PT needs.  PT to sign off.  Thank you for referral.        Jesus Alberto Alexandre, PT  2018            By cosigning this order, either electronically or physically, I certify that the therapy services are furnished while this patient is under my care, the services outline above are required by this patient, and  will be reviewed every 90 days.        M.D.:__________________________________________ Date: ______________    18 1557    18 1550  OT Plan of Care Cert / Re-Cert  Once     Comments:  Occupational Therapy Plan of Care  Initial Certification  Certification Period: 2018 - 3/8/2019    Patient Name: Albina Lopez  : 1970    (I63.442) Cerebrovascular accident (CVA) due to embolism of left cerebellar artery (CMS/HCC)                Assessment  OT Assessment  Criteria for Skilled Therapeutic Interventions Met (OT Eval): no, no problems identified which require skilled intervention                  Plan    OT Plan  Therapy Frequency (OT Eval): evaluation only  Outcome Summary: OT eval completed. Pt was I with all bed mobility, t/fs, toileting, LB dressing and functional mobility. Pt is at her PLOF and does not require skilled OT services at this time. Anticipated d/c from with assist. OT to sign off.       Carmen Roe, OTR/L  2018        By cosigning this order, either electronically or physically, I certify that the therapy services are furnished while this patient is under my care, the services outline above are required by this patient, and will be reviewed every 90 days.        M.D.:__________________________________________ Date: ______________    18 1549    18 1530  iopamidol (ISOVUE-370) 76 % injection 125 mL  Once in Imaging      18 1437    18 1339  CT Angiogram Head With & Without Contrast  1 Time Imaging      18 1338    18 1339  CT Angiogram Neck With & Without Contrast  1 Time Imaging      18 1338    18 1338  Antiphosphotidyl Antibodies Panel II  Once,   Status:  Canceled      18 1338    18 1338  Anticardiolipin Antibody, IgG / M, Qn  Once,   Status:  Canceled      18 1338    18 1338  Antithrombin III  Once,   Status:  Canceled      18 1338    18 1338  Beta-2 Glycoprotein Antibodies  Once,    Status:  Canceled      12/08/18 1338    12/08/18 1338  Factor 5 Leiden  Once,   Status:  Canceled      12/08/18 1338    12/08/18 1338  Homocysteine  Once,   Status:  Canceled      12/08/18 1338    12/08/18 1338  Lupus Anticoagulant  Once,   Status:  Canceled      12/08/18 1338    12/08/18 1338  Protein C Activity  Once,   Status:  Canceled      12/08/18 1338    12/08/18 1338  Phosphatidylserine Antibodies  Once,   Status:  Canceled      12/08/18 1338    12/08/18 1213  POC Glucose Once  Once      12/08/18 1201    12/08/18 1015  gadobenate dimeglumine (MULTIHANCE) injection 10 mL  Once in Imaging      12/08/18 0926    12/08/18 0905  Inpatient Admission  Once      12/08/18 0904    12/08/18 0900  aspirin tablet 325 mg  Daily,   Status:  Discontinued      12/07/18 2034 12/08/18 0900  aspirin suppository 300 mg  Daily,   Status:  Discontinued      12/07/18 2034 12/08/18 0700  US Carotid Bilateral  1 Time Imaging      12/07/18 2034 12/08/18 0600  Hemoglobin A1c  Morning Draw      12/07/18 2034 12/08/18 0600  Lipid Panel  Morning Draw      12/07/18 2034 12/08/18 0000  Intake and Output  Every 4 Hours,   Status:  Canceled      12/07/18 2034 12/08/18 0000  POC Glucose Q6H  Every 6 Hours,   Status:  Canceled     Comments:  May Discontinue After 2 Consecutive Readings Less Than 140Notify Provider if 2 Readings Greater Than 140      12/07/18 2034 12/07/18 2130  sodium chloride 0.9 % flush 3 mL  Every 12 Hours Scheduled,   Status:  Discontinued      12/07/18 2034 12/07/18 2130  atorvastatin (LIPITOR) tablet 80 mg  Nightly,   Status:  Discontinued      12/07/18 2034 12/07/18 2036  Diet Regular; Cardiac  Diet Effective Now,   Status:  Canceled      12/07/18 2035 12/07/18 2035  Comprehensive Metabolic Panel  Daily,   Status:  Canceled      12/07/18 2034 12/07/18 2035  CBC (No Diff)  Daily,   Status:  Canceled      12/07/18 2034 12/07/18 2033  MRI Brain With & Without Contrast  1 Time Imaging       12/07/18 2034 12/07/18 2033  Reason for Not Administering IV Thrombolytic  Once      12/07/18 2034 12/07/18 2030  Adult Transthoracic Echo Complete W/ Cont if Necessary Per Protocol (With Agitated Saline)  Once,   Status:  Canceled     Comments:  With bubble study    12/07/18 2034 12/07/18 2028  ondansetron (ZOFRAN) injection 4 mg  Every 6 Hours PRN,   Status:  Discontinued      12/07/18 2034 12/07/18 2027  acetaminophen (TYLENOL) tablet 650 mg  Every 4 Hours PRN,   Status:  Discontinued      12/07/18 2034 12/07/18 2027  acetaminophen (TYLENOL) suppository 650 mg  Every 4 Hours PRN,   Status:  Discontinued      12/07/18 2034 12/07/18 2025  Code Status and Medical Interventions:  Continuous,   Status:  Canceled      12/07/18 2034 12/07/18 2025  Assessed for Rehabilitation Services  Once      12/07/18 2034 12/07/18 2025  Place Sequential Compression Device  Once,   Status:  Canceled      12/07/18 2034 12/07/18 2025  Maintain Sequential Compression Device  Continuous,   Status:  Canceled      12/07/18 2034 12/07/18 2025  Vital Signs Per Hospital Policy  Per Hospital Policy,   Status:  Canceled     Comments:  For ICU Admission: Vital Signs Every 2 Hours  For Telemetry Unit Admission: Vital Signs Every 4 Hours  Keep Systolic Blood Pressure Less Than 220, Diastolic Blood Pressure Less Than 110    12/07/18 2034 12/07/18 2025  Pulse Oximetry, Continuous  Continuous,   Status:  Canceled      12/07/18 2034 12/07/18 2025  Cardiac Monitoring  Continuous,   Status:  Canceled      12/07/18 2034 12/07/18 2025  Turn Patient  Now Then Every 2 Hours,   Status:  Canceled      12/07/18 2034 12/07/18 2025  Neuro Checks  Per Hospital Policy,   Status:  Canceled     Comments:  For ICU Admission: Neuro Checks to Include All Stroke Deficits Every Hour x10 Hours Then Every 2 Hours  For Telemetry Unit Admission: Neuro Checks to Include All Stroke Deficits Every 4 Hours    12/07/18 2034 12/07/18  2025  NIHSS Assessment  Every Shift,   Status:  Canceled     Comments:  Turn off all sedation medications prior to performing assessment. Assessment to be performed upon admission, transfer to another unit, discharge, and with neurological decline. If NIHSS change is greater than or equal to 4 and/or neurological decline is noted notify physician.    12/07/18 2034 12/07/18 2025  Provide Stroke Education Material  Prior to Discharge,   Status:  Canceled     Comments:  Educate patient PRN and daily during hospitalization.    12/07/18 2034 12/07/18 2025  Nursing Dysphagia Screening (Complete Prior to Giving Anything By Mouth)  Once      12/07/18 2034 12/07/18 2025  RN to Place Order SLP Consult - Eval & Treat Choosing Reason of RN Dysphagia Screen Failed  Continuous,   Status:  Canceled     Comments:  RN to Place Order SLP Consult - Eval & Treat Choosing Reason of RN Dysphagia Screen Failed    12/07/18 2034 12/07/18 2025  Nurse to Call MD or Nutrition Services for Diet if Patient Passes Dysphagia Screen  Once      12/07/18 2034 12/07/18 2025  Notify Provider  Until Discontinued,   Status:  Canceled      12/07/18 2034 12/07/18 2025  NPO Diet  Diet Effective Now,   Status:  Canceled     Comments:  Strict NPO Until Nursing Dysphagia Screen Passed    12/07/18 2034 12/07/18 2025  OT Consult: Eval & Treat Stroke Patient  Once      12/07/18 2034 12/07/18 2025  PT Consult: Eval & Treat  Once      12/07/18 2034 12/07/18 2025  SLP Consult: Eval & Treat  Once,   Status:  Canceled     Comments:  Per stroke protocol.    12/07/18 2034 12/07/18 2025  Inpatient Case Management  Consult  Once     Provider:  (Not yet assigned)    12/07/18 2034 12/07/18 2025  Inpatient Diabetes Educator Consult  Once,   Status:  Canceled     Provider:  (Not yet assigned)    12/07/18 2034 12/07/18 2025  Insert Peripheral IV  Once,   Status:  Canceled      12/07/18 2034 12/07/18 2025  Saline Lock &  Maintain IV Access  Continuous,   Status:  Canceled      12/07/18 2034 12/07/18 2024  sodium chloride 0.9 % flush 3-10 mL  As Needed,   Status:  Discontinued      12/07/18 2034 12/07/18 2024  Order CT Head Without Contrast for Neurological Decline  As Needed,   Status:  Canceled      12/07/18 2034    --  SCANNED - TELEMETRY        12/07/18 0000    --  lisinopril (PRINIVIL,ZESTRIL) 20 MG tablet  Daily      12/08/18 0513    --  cetirizine (zyrTEC) 10 MG tablet  Daily      12/08/18 0513    --  SCANNED - TELEMETRY        12/07/18 0000    --  SCANNED - TELEMETRY        12/07/18 0000          Physician Progress Notes (last 7 days) (Notes from 12/4/2018  2:11 PM through 12/11/2018  2:11 PM)     No notes of this type exist for this encounter.        Consult Notes (last 7 days) (Notes from 12/04/18 through 12/11/18)     No notes of this type exist for this encounter.

## 2018-12-11 NOTE — OUTREACH NOTE
Prep Survey      Responses   Facility patient discharged from?  [facility]   Is patient eligible?  Yes   Discharge diagnosis  acute CVA   Does the patient have one of the following disease processes/diagnoses(primary or secondary)?  Stroke (TIA)   Does the patient have Home health ordered?  No   Is there a DME ordered?  No   Medication alerts for this patient  ASA started   Prep survey completed?  Yes          Estela Salmeron RN

## 2018-12-12 LAB
PS IGA SER-ACNC: 8 APS IGA (ref 0–20)
PS IGG SER-ACNC: 10 GPS IGG (ref 0–11)
PS IGM SER-ACNC: 6 MPS IGM (ref 0–25)

## 2018-12-13 LAB
B2 GLYCOPROT1 IGA SER-ACNC: <9 GPI IGA UNITS (ref 0–25)
B2 GLYCOPROT1 IGG SER-ACNC: <9 GPI IGG UNITS (ref 0–20)
B2 GLYCOPROT1 IGM SER-ACNC: <9 GPI IGM UNITS (ref 0–32)
PRT C ACTIVITY (CHROMOGENIC): 114 %

## 2018-12-17 LAB — F5 GENE MUT ANL BLD/T: NORMAL

## 2018-12-18 ENCOUNTER — READMISSION MANAGEMENT (OUTPATIENT)
Dept: CALL CENTER | Facility: HOSPITAL | Age: 48
End: 2018-12-18

## 2018-12-18 ENCOUNTER — OFFICE VISIT (OUTPATIENT)
Dept: INTERNAL MEDICINE | Facility: CLINIC | Age: 48
End: 2018-12-18

## 2018-12-18 ENCOUNTER — TELEPHONE (OUTPATIENT)
Dept: INTERNAL MEDICINE | Facility: CLINIC | Age: 48
End: 2018-12-18

## 2018-12-18 VITALS
OXYGEN SATURATION: 98 % | HEART RATE: 68 BPM | HEIGHT: 66 IN | RESPIRATION RATE: 16 BRPM | WEIGHT: 161.5 LBS | SYSTOLIC BLOOD PRESSURE: 145 MMHG | BODY MASS INDEX: 25.96 KG/M2 | DIASTOLIC BLOOD PRESSURE: 99 MMHG

## 2018-12-18 DIAGNOSIS — E78.2 MIXED HYPERLIPIDEMIA: ICD-10-CM

## 2018-12-18 DIAGNOSIS — I63.9 CEREBELLAR STROKE (HCC): Primary | ICD-10-CM

## 2018-12-18 DIAGNOSIS — I10 ESSENTIAL HYPERTENSION: ICD-10-CM

## 2018-12-18 DIAGNOSIS — E66.3 OVERWEIGHT (BMI 25.0-29.9): ICD-10-CM

## 2018-12-18 PROCEDURE — 99204 OFFICE O/P NEW MOD 45 MIN: CPT | Performed by: INTERNAL MEDICINE

## 2018-12-18 RX ORDER — LISINOPRIL 10 MG/1
10 TABLET ORAL DAILY
Qty: 30 TABLET | Refills: 5 | COMMUNITY
Start: 2018-12-18 | End: 2019-07-23 | Stop reason: SDUPTHER

## 2018-12-18 NOTE — OUTREACH NOTE
Stroke Week 2 Survey      Responses   Facility patient discharged from?  [facility]   Does the patient have one of the following disease processes/diagnoses(primary or secondary)?  Stroke (TIA)   Week 2 attempt successful?  No   Unsuccessful attempts  Attempt 1          Ayush Ahumada RN

## 2018-12-18 NOTE — TELEPHONE ENCOUNTER
Patient saw Dr. Julian today and says her pharmacy did not have her prescription for today 12/18/2018 of lisinopril.

## 2018-12-18 NOTE — PROGRESS NOTES
CC: Establish care for recent stroke    History:  Albina Lopez is a 48 y.o. female who presents today for evaluation of the above problems.  She notes she has been doing well since her hospitalization about 2 weeks ago.  She was hospitalized there after having an episode of dizziness, gait disturbance, and feeling of being unwell.  She was diagnosed with ischemic stroke of the left cerebellar area.  Follow-up CTAs showed left vertebral artery occlusion.  There are also other vascular abnormalities and her posterior circulation is largely fed by the anterior.  She has been on lisinopril in the past, but had stopped this because her blood pressure remained good in the hospital.  She has lost weight since she was originally started on this medication.  She has been started on aspirin and Lipitor, which she has tolerated well since her discharge.    ROS:  Review of Systems   Constitutional: Negative for chills and fever.   HENT: Negative for congestion and sore throat.    Eyes: Negative for visual disturbance.   Respiratory: Negative for cough and shortness of breath.    Cardiovascular: Negative for chest pain and palpitations.   Gastrointestinal: Negative for abdominal pain, constipation and nausea.   Endocrine: Negative for cold intolerance and heat intolerance.   Genitourinary: Negative for difficulty urinating and frequency.   Musculoskeletal: Negative for arthralgias and back pain.   Skin: Negative for rash.   Neurological: Positive for headaches. Negative for dizziness.   Psychiatric/Behavioral: Negative for dysphoric mood. The patient is not nervous/anxious.        No Known Allergies  Past Medical History:   Diagnosis Date   • Hypertension    • Stroke (CMS/HCC)      Past Surgical History:   Procedure Laterality Date   •  SECTION       Family History   Problem Relation Age of Onset   • Skin cancer Mother    • Heart disease Mother    • Hypertension Father    • Lung cancer Maternal Grandfather    • Skin  "cancer Paternal Grandmother       reports that she has quit smoking. she has never used smokeless tobacco. She reports that she drinks alcohol. She reports that she does not use drugs.      Current Outpatient Medications:   •  aspirin 81 MG chewable tablet, Chew 1 tablet Daily., Disp: 30 tablet, Rfl: 1  •  atorvastatin (LIPITOR) 80 MG tablet, Take 1 tablet by mouth Every Night., Disp: 30 tablet, Rfl: 1  •  cetirizine (zyrTEC) 10 MG tablet, Take 10 mg by mouth Daily., Disp: , Rfl:   •  lisinopril (PRINIVIL,ZESTRIL) 10 MG tablet, Take 1 tablet by mouth Daily., Disp: 30 tablet, Rfl: 5  Current outpatient and discharge medications have been reconciled for the patient.  Reviewed by: Bunny Julian DO    OBJECTIVE:  /99 (BP Location: Left arm, Patient Position: Sitting, Cuff Size: Adult)   Pulse 68   Resp 16   Ht 167.6 cm (66\")   Wt 73.3 kg (161 lb 8 oz)   SpO2 98%   Breastfeeding? No   BMI 26.07 kg/m²    Physical Exam   Constitutional: She is oriented to person, place, and time. She appears well-developed and well-nourished. No distress.   HENT:   Head: Normocephalic and atraumatic.   Right Ear: External ear normal.   Left Ear: External ear normal.   Nose: Nose normal.   Mouth/Throat: Oropharynx is clear and moist. No oropharyngeal exudate.   Eyes: EOM are normal. No scleral icterus.   Neck: Normal range of motion. No tracheal deviation present.   Cardiovascular: Normal rate, regular rhythm and normal heart sounds.   No murmur heard.  Pulmonary/Chest: Effort normal and breath sounds normal. No accessory muscle usage. No respiratory distress. She has no wheezes.   Abdominal: Soft. Bowel sounds are normal. She exhibits no distension. There is no tenderness.   Musculoskeletal: Normal range of motion.   Neurological: She is alert and oriented to person, place, and time. Coordination and gait normal.   Skin: Skin is warm and dry. No cyanosis. Nails show no clubbing.   No jaundice   Psychiatric: She has a " normal mood and affect. Her mood appears not anxious. She does not exhibit a depressed mood.       Assessment/Plan    Diagnoses and all orders for this visit:    Cerebellar stroke (CMS/HCC)  I reviewed her MRI and CTA imaging.  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  Much of this is aimed at managing risk factors which could complicate her congenital disease.  She has no new symptoms or worsening condition.  I believe careful management of her blood pressure is indicated to avoid both hypertension, but to also avoid hypotension to allow adequate perfusion to her posterior circulation.    Essential hypertension  Fair control, BP goal for age is <140/90 per JNC 8 guidelines and decrease lisinopril to 10mg to avoid relative hypotension. Given congenital changes, we want to allow normotension to prevent stress on her posterior circulation.     Overweight (BMI 25.0-29.9)  Recommended attention to portion control and being careful about the types and timing of meals for the purpose of weight management.    Mixed hyperlipidemia  Stable on high intensity statin therapy per ACC/AHA guidelines. Plan to repeat at follow-up unless repeated by Neurology prior.      An After Visit Summary was printed and given to the patient at discharge.  Return in about 3 months (around 3/18/2019) for Annual physical with Genna CAMPBELL.         Bunny Julian D.O. 12/18/2018

## 2018-12-19 ENCOUNTER — READMISSION MANAGEMENT (OUTPATIENT)
Dept: CALL CENTER | Facility: HOSPITAL | Age: 48
End: 2018-12-19

## 2018-12-19 NOTE — OUTREACH NOTE
Stroke Week 2 Survey      Responses   Facility patient discharged from?  [place]   Does the patient have one of the following disease processes/diagnoses(primary or secondary)?  Stroke (TIA)   Week 2 attempt successful?  Yes   Call start time  1244   Call end time  1248   Discharge diagnosis  acute CVA   Is patient permission given to speak with other caregiver?  Yes   Medication alerts for this patient  ASA started   Meds reviewed with patient/caregiver?  Yes   Is the patient having any side effects they believe may be caused by any medication additions or changes?  No   Does the patient have all medications ordered at discharge?  Yes   Is the patient taking all medications as directed (includes completed medication regime)?  Yes   Medication comments  PCP cut b/p med in half   Does the patient have a primary care provider?   Yes   Does the patient have an appointment with their PCP within 7 days of discharge?  Yes   Has the patient kept scheduled appointments due by today?  Yes   Has home health visited the patient within 72 hours of discharge?  N/A   Psychosocial issues?  No   Does the patient require any assistance with activities of daily living such as eating, bathing, dressing, walking, etc.?  No   Does the patient have any residual symptoms from stroke/TIA?  No   Residual symptoms comments  no deficits   Does the patient understand the diet ordered at discharge?  No   Did the patient receive a copy of their discharge instructions?  Yes   Nursing interventions  Reviewed instructions with patient   What is the patient's perception of their health status since discharge?  Improving   Nursing interventions  Nurse provided patient education   Is the patient able to teach back FAST for Stroke?  Yes   Is the patient/caregiver able to teach back the risk factors for a stroke?  High blood pressure-goal below 120/80, History of TIAs, Physical inactivity and obesity, History of Afib, High Cholesterol, Excessive alcohol  intake   Is the patient/caregiver able to teach back signs and symptoms related to disease process for when to call PCP?  Yes   Is the patient/caregiver able to teach back signs and symptoms related to disease process for when to call 911?  Yes   Is the patient/caregiver able to teach back the hierarchy of who to call/visit for symptoms/problems? PCP, Specialist, Home health nurse, Urgent Care, ED, 911  Yes   Additional teach back comments  pt states she is doing well. We reviewed s/s of to watch for moving forward.   Week 2 call completed?  Yes          Ingrid Gamboa RN

## 2018-12-20 LAB
ANTI-PHOSPHATIDIC ACID: NORMAL
ANTI-PHOSPHATIDIC,IGA: 7.4 U/ML
ANTI-PHOSPHATIDIC,IGG: 3.3 U/ML
ANTI-PHOSPHATIDIC,IGM: 1.7 U/ML
ANTI-PHOSPHATIDYL GLYCEROL, IGA: 3.2 U/ML
ANTI-PHOSPHATIDYL GLYCEROL, IGG: 2.5 U/ML
ANTI-PHOSPHATIDYL GLYCEROL, IGM: 2.1 U/ML
ANTI-PHOSPHATIDYL GLYCEROL: NORMAL
ANTI-PHOSPHATIDYL INOSITOL: NORMAL
ANTI-PHOSPHATIDYL,IGA: 5.3 U/ML
ANTI-PHOSPHATIDYL,IGG: 4.1 U/ML
ANTI-PHOSPHATIDYL,IGM: 2 U/ML
ANTI-PHOSPHATIDYLETHANOLAMINE, IGA: 2.1 U/ML
ANTI-PHOSPHATIDYLETHANOLAMINE, IGG: 1.1 U/ML
ANTI-PHOSPHATIDYLETHANOLAMINE, IGM: 1.1 U/ML
ANTI-PHOSPHATIDYLETHANOLAMINE: NORMAL

## 2019-01-22 ENCOUNTER — LAB (OUTPATIENT)
Dept: LAB | Facility: HOSPITAL | Age: 49
End: 2019-01-22

## 2019-01-22 ENCOUNTER — OFFICE VISIT (OUTPATIENT)
Dept: NEUROLOGY | Facility: CLINIC | Age: 49
End: 2019-01-22

## 2019-01-22 VITALS
HEIGHT: 66 IN | WEIGHT: 170 LBS | HEART RATE: 74 BPM | BODY MASS INDEX: 27.32 KG/M2 | RESPIRATION RATE: 18 BRPM | SYSTOLIC BLOOD PRESSURE: 110 MMHG | DIASTOLIC BLOOD PRESSURE: 62 MMHG

## 2019-01-22 DIAGNOSIS — R00.2 PALPITATIONS: ICD-10-CM

## 2019-01-22 DIAGNOSIS — I63.9 CEREBELLAR STROKE (HCC): Primary | ICD-10-CM

## 2019-01-22 DIAGNOSIS — I63.9 CEREBELLAR STROKE (HCC): ICD-10-CM

## 2019-01-22 DIAGNOSIS — I65.29 STENOSIS OF CAROTID ARTERY, UNSPECIFIED LATERALITY: ICD-10-CM

## 2019-01-22 DIAGNOSIS — M79.10 MYALGIA: ICD-10-CM

## 2019-01-22 LAB
ARTICHOKE IGE QN: 74 MG/DL (ref 0–99)
CHOLEST SERPL-MCNC: 134 MG/DL (ref 130–200)
HDLC SERPL-MCNC: 49 MG/DL
LDLC/HDLC SERPL: 1.14 {RATIO}
TRIGL SERPL-MCNC: 145 MG/DL (ref 0–149)
TSH SERPL DL<=0.05 MIU/L-ACNC: 2.24 MIU/ML (ref 0.47–4.68)
VIT B12 BLD-MCNC: 388 PG/ML (ref 239–931)

## 2019-01-22 PROCEDURE — 99213 OFFICE O/P EST LOW 20 MIN: CPT | Performed by: NURSE PRACTITIONER

## 2019-01-22 PROCEDURE — 36415 COLL VENOUS BLD VENIPUNCTURE: CPT

## 2019-01-22 PROCEDURE — 84443 ASSAY THYROID STIM HORMONE: CPT | Performed by: NURSE PRACTITIONER

## 2019-01-22 PROCEDURE — 82550 ASSAY OF CK (CPK): CPT | Performed by: NURSE PRACTITIONER

## 2019-01-22 PROCEDURE — 80061 LIPID PANEL: CPT | Performed by: NURSE PRACTITIONER

## 2019-01-22 PROCEDURE — 82607 VITAMIN B-12: CPT | Performed by: NURSE PRACTITIONER

## 2019-01-22 PROCEDURE — 80053 COMPREHEN METABOLIC PANEL: CPT | Performed by: NURSE PRACTITIONER

## 2019-01-22 NOTE — PROGRESS NOTES
Subjective     Chief Complaint   Patient presents with   • Stroke       Albina Lopez is a 48 y.o. female who presents today for HFU stroke.  She is present today alone. Her  is listening via phone in the room. She denies any new stroke symptoms. She denies headaches. She states that she is close to baseline. She does notice some imbalance issues and will sway to the right when she overexerts herself at the end of the day. She denies any falls. She is not doing PT/OT. 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 She denies any diplopia, blurry vision.She denies any dysphagia. She was experiencing some muscle aches that she thinks was related to taking her Lipitor during the day but states that this has subsided since she resumed taking it at night. She denies abdominal pain or dark urine. She continues to take Lipitor 80 mg and ASA 81 mg. She denies any BRBPR. Dark tarry stools, hematuria. She has no other significant medical history other than HTN. LDL was 117 inpatient. Hemoglobin A1C was 4.9. She denies any tobacco use, illegal substance use. She does have an occasional alcoholic beverage socially. She states that about 10 years ago she underwent cardiac monitoring for flutter of her heart. She states that every once in a while she will feel one beat that flutters but nothing prolonged. She denies any chest pain, SOB.     She did wake up on 12/29/2018 with headache, nausea, and vomiting. She went to [facility] and was admitted for monitoring overnight. She underwent repeat MRI scan and CT scan. No new strokes were noted. No bleed was noted. Patient states they determined that she may have had a virus. She did not experience dizziness like she did with her stroke. I do not have those records for review today.     As you recall, patient was transferred from outlying facility in [facility] the sudden onset a  mild headache, vertigo, right arm and leg discrimination.. Had waxed and waned for 3 days prior to her resenting for evaluation.  She did notice some difficulty swallowing as well.  She presented to her primary care physician who sent her to the local emergency department tracheotomy.  A CT scan showed concern for cerebellar stroke.  Dr. Clark did evaluate her and 12 8 2018.  That point she was feeling better.  She was tolerating by mouth diet well.  She had some mild residual vertigo but otherwise no complaints of ataxia.  She denied any weakness or numbness.  She denied headache.  Denies family history of hypercoagulable states.  She was not a TPA candidate secondary to presenting outside the 4-1/2 hour window.  MRI of the brain showed a midline cerebellar cerebellar infarct.  CT angiography of the head and neck showed an occluded vertebral artery which may be chronic in nature.  The posterior circulation was set on the bilateral fetal PCA arteries.  Her lipid profile did show an elevation of .  She lab work was normal.  He was discharged in stable condition awake and alert and oriented ×3.  She had no nystagmus on examination.  There is no significant ataxia. TTE and hypercoag panel were pending at time of discharge.     Stroke   Chronicity: midline cerebellar stroke. The current episode started 1 to 4 weeks ago. The problem has been gradually improving. Associated symptoms comments: Vertigo, right arm and leg discrimination, headache, some dysphagia initially.. Exacerbated by: HTN, vertebral artery occlusion. Treatments tried: asa, statin, BP control.        Current Outpatient Medications   Medication Sig Dispense Refill   • aspirin 81 MG chewable tablet Chew 1 tablet Daily. 30 tablet 1   • atorvastatin (LIPITOR) 80 MG tablet Take 1 tablet by mouth Every Night. 30 tablet 1   • cetirizine (zyrTEC) 10 MG tablet Take 10 mg by mouth Daily.     • lisinopril (PRINIVIL,ZESTRIL) 10 MG tablet Take 1 tablet by  "mouth Daily. 30 tablet 5     No current facility-administered medications for this visit.        Past Medical History:   Diagnosis Date   • Hypertension    • Stroke (CMS/HCC)        Past Surgical History:   Procedure Laterality Date   •  SECTION         family history includes Heart disease in her mother; Hypertension in her father; Lung cancer in her maternal grandfather; Skin cancer in her mother and paternal grandmother.    Social History     Tobacco Use   • Smoking status: Former Smoker   • Smokeless tobacco: Never Used   Substance Use Topics   • Alcohol use: Yes     Comment: 3 TIMES A WEEK    • Drug use: No       Review of Systems   Constitutional: Negative.    HENT: Negative.    Eyes: Negative.    Respiratory: Negative.    Cardiovascular: Negative.    Gastrointestinal: Negative.    Endocrine: Negative.    Genitourinary: Negative.    Musculoskeletal: Negative.    Skin: Negative.    Allergic/Immunologic: Negative.    Neurological: Negative.    Hematological: Negative.    Psychiatric/Behavioral: Negative.    All other systems reviewed and are negative.      Objective     /62 (BP Location: Left arm, Patient Position: Sitting)   Pulse 74   Resp 18   Ht 167.6 cm (66\")   Wt 77.1 kg (170 lb)   LMP 2018 (Approximate)   Breastfeeding? No   BMI 27.44 kg/m² , Body mass index is 27.44 kg/m².    Physical Exam   Constitutional: She is oriented to person, place, and time. She appears well-developed and well-nourished.   HENT:   Head: Normocephalic and atraumatic.   Eyes: EOM are normal. Pupils are equal, round, and reactive to light.   Neck: Normal range of motion. Neck supple.   Cardiovascular: Normal rate, normal heart sounds and intact distal pulses.   Pulmonary/Chest: Effort normal and breath sounds normal.   Abdominal: Soft.   Musculoskeletal: Normal range of motion.   Neurological: She is alert and oriented to person, place, and time. She has normal strength and normal reflexes. She displays a " negative Romberg sign. GCS eye subscore is 4. GCS verbal subscore is 5. GCS motor subscore is 6.   Reflex Scores:       Tricep reflexes are 2+ on the right side and 2+ on the left side.       Bicep reflexes are 2+ on the right side and 2+ on the left side.       Brachioradialis reflexes are 2+ on the right side and 2+ on the left side.       Patellar reflexes are 2+ on the right side and 2+ on the left side.       Achilles reflexes are 2+ on the right side and 2+ on the left side.  Awake, alert. No aphasia, no dysarthria, no ataxia  Completes simple and complex commands    CN II:  Visual fields full.  Pupils equally reactive to light  CN III, IV, VI:  Extraocular Muscles full with no signs of nystagmus  CN V:  Facial sensory is symmetric with no asymetries.  CN VII:  Facial motor symmetric  CN VIII:  Gross hearing intact bilaterally  CN IX:  Palate elevates symmetrically  CN X:  Palate elevates symmetrically  CN XI:  Shoulder shrug symmetric  CN XII:  Tongue is midline on protrusion    FTN normal, HTS normal    Full and symmetric strength bilateral upper and lower extremities.   Skin: Skin is warm and dry. Capillary refill takes less than 2 seconds.   Psychiatric: She has a normal mood and affect. Her speech is normal and behavior is normal. Cognition and memory are normal.   Nursing note and vitals reviewed.        Results for orders placed or performed during the hospital encounter of 12/07/18   Comprehensive Metabolic Panel   Result Value Ref Range    Glucose 88 70 - 100 mg/dL    BUN 13 5 - 21 mg/dL    Creatinine 0.70 0.50 - 1.40 mg/dL    Sodium 140 135 - 145 mmol/L    Potassium 4.3 3.5 - 5.3 mmol/L    Chloride 101 98 - 110 mmol/L    CO2 27.0 24.0 - 31.0 mmol/L    Calcium 9.4 8.4 - 10.4 mg/dL    Total Protein 7.8 6.3 - 8.7 g/dL    Albumin 4.30 3.50 - 5.00 g/dL    ALT (SGPT) 22 0 - 54 U/L    AST (SGOT) 22 7 - 45 U/L    Alkaline Phosphatase 66 24 - 120 U/L    Total Bilirubin 0.8 0.1 - 1.0 mg/dL    eGFR Non African  Amer 89 >60 mL/min/1.73    Globulin 3.5 gm/dL    A/G Ratio 1.2 1.1 - 2.5 g/dL    BUN/Creatinine Ratio 18.6 7.0 - 25.0    Anion Gap 12.0 4.0 - 13.0 mmol/L   CBC (No Diff)   Result Value Ref Range    WBC 9.65 4.80 - 10.80 10*3/mm3    RBC 5.31 4.20 - 5.40 10*6/mm3    Hemoglobin 14.5 12.0 - 16.0 g/dL    Hematocrit 43.5 37.0 - 47.0 %    MCV 81.9 (L) 82.0 - 98.0 fL    MCH 27.3 (L) 28.0 - 32.0 pg    MCHC 33.3 33.0 - 36.0 g/dL    RDW 12.8 12.0 - 15.0 %    RDW-SD 37.7 (L) 40.0 - 54.0 fl    MPV 9.3 6.0 - 12.0 fL    Platelets 385 130 - 400 10*3/mm3   Hemoglobin A1c   Result Value Ref Range    Hemoglobin A1C 4.9 %   Lipid Panel   Result Value Ref Range    Total Cholesterol 166 130 - 200 mg/dL    Triglycerides 122 0 - 149 mg/dL    HDL Cholesterol 37 (L) >=50 mg/dL    LDL Cholesterol  117 (H) 0 - 99 mg/dL    LDL/HDL Ratio 2.83    Comprehensive Metabolic Panel   Result Value Ref Range    Glucose 85 70 - 100 mg/dL    BUN 18 5 - 21 mg/dL    Creatinine 0.78 0.50 - 1.40 mg/dL    Sodium 140 135 - 145 mmol/L    Potassium 4.5 3.5 - 5.3 mmol/L    Chloride 103 98 - 110 mmol/L    CO2 25.0 24.0 - 31.0 mmol/L    Calcium 9.3 8.4 - 10.4 mg/dL    Total Protein 6.4 6.3 - 8.7 g/dL    Albumin 3.80 3.50 - 5.00 g/dL    ALT (SGPT) 28 0 - 54 U/L    AST (SGOT) 21 7 - 45 U/L    Alkaline Phosphatase 51 24 - 120 U/L    Total Bilirubin 0.7 0.1 - 1.0 mg/dL    eGFR Non African Amer 78 >60 mL/min/1.73    Globulin 2.6 gm/dL    A/G Ratio 1.5 1.1 - 2.5 g/dL    BUN/Creatinine Ratio 23.1 7.0 - 25.0    Anion Gap 12.0 4.0 - 13.0 mmol/L   CBC (No Diff)   Result Value Ref Range    WBC 8.65 4.80 - 10.80 10*3/mm3    RBC 5.08 4.20 - 5.40 10*6/mm3    Hemoglobin 13.8 12.0 - 16.0 g/dL    Hematocrit 41.9 37.0 - 47.0 %    MCV 82.5 82.0 - 98.0 fL    MCH 27.2 (L) 28.0 - 32.0 pg    MCHC 32.9 (L) 33.0 - 36.0 g/dL    RDW 12.7 12.0 - 15.0 %    RDW-SD 38.0 (L) 40.0 - 54.0 fl    MPV 9.6 6.0 - 12.0 fL    Platelets 364 130 - 400 10*3/mm3   Comprehensive Metabolic Panel   Result Value  Ref Range    Glucose 87 70 - 100 mg/dL    BUN 14 5 - 21 mg/dL    Creatinine 0.68 0.50 - 1.40 mg/dL    Sodium 138 135 - 145 mmol/L    Potassium 4.3 3.5 - 5.3 mmol/L    Chloride 101 98 - 110 mmol/L    CO2 24.0 24.0 - 31.0 mmol/L    Calcium 9.2 8.4 - 10.4 mg/dL    Total Protein 6.5 6.3 - 8.7 g/dL    Albumin 3.60 3.50 - 5.00 g/dL    ALT (SGPT) 25 0 - 54 U/L    AST (SGOT) 17 7 - 45 U/L    Alkaline Phosphatase 51 24 - 120 U/L    Total Bilirubin 0.6 0.1 - 1.0 mg/dL    eGFR Non African Amer 92 >60 mL/min/1.73    Globulin 2.9 gm/dL    A/G Ratio 1.2 1.1 - 2.5 g/dL    BUN/Creatinine Ratio 20.6 7.0 - 25.0    Anion Gap 13.0 4.0 - 13.0 mmol/L   CBC (No Diff)   Result Value Ref Range    WBC 9.73 4.80 - 10.80 10*3/mm3    RBC 4.88 4.20 - 5.40 10*6/mm3    Hemoglobin 13.5 12.0 - 16.0 g/dL    Hematocrit 40.9 37.0 - 47.0 %    MCV 83.8 82.0 - 98.0 fL    MCH 27.7 (L) 28.0 - 32.0 pg    MCHC 33.0 33.0 - 36.0 g/dL    RDW 12.7 12.0 - 15.0 %    RDW-SD 38.5 (L) 40.0 - 54.0 fl    MPV 9.7 6.0 - 12.0 fL    Platelets 347 130 - 400 10*3/mm3   Anticardiolipin Antibody, IgG / M, Qn   Result Value Ref Range    Anticardiolipin IgG <9 0 - 14 GPL U/mL    Anticardiolipin IgM <9 0 - 12 MPL U/mL   Antiphosphotidyl Antibodies Panel II   Result Value Ref Range    Anti-Phosphatidyl Inositol Comment     Anti-Phosphatidyl,IgA 5.3 <12.0 U/mL    Anti-Phosphatidyl,IgG 4.1 <12.0 U/mL    Anti-Phosphatidyl,IgM 2.0 <12.0 U/mL    Anti-Phosphatidic Acid Comment     Anti-Phosphatidic,IgA 7.4 <12.0 U/mL    Anti-Phosphatidic,IgG 3.3 <12.0 U/mL    Anti-Phosphatidic,IgM 1.7 <12.0 U/mL    Anti-Phosphatidylethanolamine Comment     Anti-Phosphatidylethanolamine, IgA 2.1 <12.0 U/mL    Anti-Phosphatidylethanolamine, IgG 1.1 <12.0 U/mL    Anti-Phosphatidylethanolamine, IgM 1.1 <12.0 U/mL    Anti-Phosphatidyl Glycerol Comment     Anti-Phosphatidyl Glycerol, IgA 3.2 <12.0 U/mL    Anti-Phosphatidyl Glycerol, IgG 2.5 <12.0 U/mL    Anti-Phosphatidyl Glycerol, IgM 2.1 <12.0 U/mL    Antithrombin III   Result Value Ref Range    Antithrombin Activity 109 84 - 123 %   Beta-2 Glycoprotein Antibodies   Result Value Ref Range    Beta-2 Glyco 1 IgG <9 0 - 20 GPI IgG units    Beta-2 Glyco 1 IgA <9 0 - 25 GPI IgA units    Beta-2 Glyco 1 IgM <9 0 - 32 GPI IgM units   Factor 5 Leiden   Result Value Ref Range    Factor V Leiden Comment    Homocysteine   Result Value Ref Range    Homocystine, Plasma (Quant) 10.5 0.0 - 15.0 umol/L   Lupus Anticoagulant   Result Value Ref Range    Dilute Prothrombin Time(dPT) 51.6 0.0 - 55.0 sec    dPT Confirm Ratio 1.07 0.00 - 1.40 Ratio    Thrombin Time 16.3 0.0 - 23.0 sec    PTT Lupus Anticoagulant 30.8 0.0 - 51.9 sec    Dilute Viper Venom Time 39.2 0.0 - 47.0 sec    Lupus Anticoagulant Reflex Comment:    Phosphatidylserine Antibodies   Result Value Ref Range    Antiphosphatidylserine IgM 6 0 - 25 MPS IgM    Antiphosphatidylserine IgA 8 0 - 20 APS IgA    Antiphosphatidylserine IgG 10 0 - 11 GPS IgG   Protein C Activity   Result Value Ref Range    Prt C Activity (Chromogenic) 114 %   POC Glucose Once   Result Value Ref Range    Glucose 140 (H) 70 - 130 mg/dL   POC Glucose Once   Result Value Ref Range    Glucose 82 70 - 130 mg/dL   Adult Transthoracic Echo Complete W/ Cont if Necessary Per Protocol (With Agitated Saline)   Result Value Ref Range    BSA 1.8 m^2    IVSd 1.0 cm    LVIDd 3.9 cm    LVIDs 2.6 cm    LVPWd 1.1 cm    IVS/LVPW 0.91     FS 33.3 %    EDV(Teich) 65.9 ml    ESV(Teich) 24.6 ml    EF(Teich) 62.7 %    EDV(cubed) 59.3 ml    ESV(cubed) 17.6 ml    EF(cubed) 70.4 %    LV mass(C)d 131.0 grams    LV mass(C)dI 72.8 grams/m^2    SV(Teich) 41.3 ml    SI(Teich) 23.0 ml/m^2    SV(cubed) 41.7 ml    SI(cubed) 23.2 ml/m^2    Ao root diam 3.0 cm    Ao root area 7.1 cm^2    LA dimension 3.0 cm    LA/Ao 1.0     LVOT diam 1.9 cm    LVOT area 2.8 cm^2    LVOT area(traced) 2.8 cm^2    LVLd ap4 8.1 cm    EDV(MOD-sp4) 60.6 ml    LVLs ap4 5.5 cm    ESV(MOD-sp4) 18.0 ml     EF(MOD-sp4) 70.3 %    SV(MOD-sp4) 42.6 ml    SI(MOD-sp4) 23.7 ml/m^2    Ao root area (BSA corrected) 1.7     LV Brooks Vol (BSA corrected) 33.7 ml/m^2    LV Sys Vol (BSA corrected) 10.0 ml/m^2    MV E max medhat 59.7 cm/sec    MV A max medhat 83.4 cm/sec    MV E/A 0.72     MV dec time 0.23 sec    Ao pk medhat 125.0 cm/sec    Ao max PG 6.3 mmHg    Ao max PG (full) 0 mmHg    Ao V2 mean 85.1 cm/sec    Ao mean PG 3.0 mmHg    Ao mean PG (full) -1.0 mmHg    Ao V2 VTI 21.8 cm    MARION(I,A) 3.7 cm^2    MARION(I,D) 3.7 cm^2    MARION(V,A) 2.8 cm^2    MARION(V,D) 2.8 cm^2    LV V1 max PG 6.3 mmHg    LV V1 mean PG 4.0 mmHg    LV V1 max 125.0 cm/sec    LV V1 mean 87.2 cm/sec    LV V1 VTI 28.1 cm    SV(Ao) 154.1 ml    SI(Ao) 85.6 ml/m^2    SV(LVOT) 79.7 ml    SI(LVOT) 44.3 ml/m^2     CV ECHO KIMBERLEE - BZI_BMI 25.2 kilograms/m^2     CV ECHO [place]IM[place]LEE - BSA([facility] 1.8 m^2     CV ECHO KIMBERLEE - BZI_METRIC_WEIGHT 70.8 kg     CV ECHO KIMBERLEE - BZI_METRIC_HEIGHT 167.6 cm    Target HR (85%) 145 bpm    Max. Pred. HR (100%) 171 bpm    LA Volume Index 22.2 mL/m2    Avg E/e' ratio 5.48     Lat Peak E' [place] 7.8 cm/sec    Med Peak E' [place] 14.00 cm/sec    Echo EF Estimated 60 %      MRI Brain:  FINDINGS:   Diffusion: There is restricted diffusion with associated ADC mapping  abnormality involving the left cerebellar tonsil, left vermis and  inferior left cerebellar hemisphere. On the lower most cut through the  right cerebellar tonsil there also appears to be some restricted  diffusion within the right tonsil. No other foci of restricted diffusion  are present.     Midline structures: Nondisplaced.     Ventricles: Normal in configuration and symmetric in size.     Masses: No masses or mass effect.     Basilar cisterns: Maintained.     Extra axial space: No abnormal extra-axial fluid.     Gray-white matter signal: There are scattered foci of T2 abnormality  involving the periventricular and higher white matter tracts. These are  nonspecific but are suspected to represent  small vessel ischemic  disease. There is sulcal effacement and abnormal T2 signal within the  infarct distribution within the inferior left cerebellum. This infarct  is at least several days in age but less than 10 days in age.     Cerebellum: Previously described infarct in the left posterior inferior  cerebellar artery territory. Cerebellum is otherwise unremarkable.     Brainstem: Normal.     Enhancement: No abnormal enhancement.     Other: Proximal cervical spinal cord is normal. Bilateral globes and  orbits are normal in appearance. Normal cerebrovascular flow voids  noted. No abnormal signal in the mastoid air cells or paranasal sinuses.     IMPRESSION:  1. Restricted diffusion within the inferior left cerebellar hemisphere,  left cerebellar tonsil and vermis. On the lower most cut of today's  diffusion study there also appears to be some ischemia within the right  cerebellar tonsil although there is no associated abnormal T2 signal on  the more heavily T2 weighted sequences in this distribution. This  infarct is at least a few days in age with associated sulcal effacement  and edema. There is no significant mass effect or shift of the midline.  There is no associated abnormal contrast enhancement or hemorrhage. This  infarct is within the posterior inferior cerebellar artery territory.  2. Scattered foci of T2 abnormality suggesting mild small vessel  disease. The brain is otherwise unremarkable.    Carotid US:  IMPRESSION:  Impression:  1. There is less than 50% stenosis of the right internal carotid artery.  2. There is 50-69% stenosis of the left internal carotid artery.  3. Antegrade flow is demonstrated in the right vertebral artery. The  left vertebral artery was unable to be visualized.     Comments: Bilateral carotid vertebral arterial duplex scan was  performed.     Grayscale imaging shows intimal thickening and calcified elements at the  carotid bifurcation. The right internal carotid artery peak  systolic  velocity is 119 cm/sec. The end-diastolic velocity is 51.9 cm/sec. The  right ICA/CCA ratio is approximately 0.92 . These findings correlate  with less than 50% stenosis of the right internal carotid artery.     Grayscale imaging shows intimal thickening and calcified elements at the  carotid bifurcation. The left internal carotid artery peak systolic  velocity is 156 cm/sec. The end-diastolic velocity is 58.1 cm/sec. The  left ICA/CCA ratio is approximately 1.05 . These findings correlate with  50-69% stenosis of the left internal carotid artery.     Antegrade flow is demonstrated in the right vertebral artery. Left  vertebral artery was viable to be visualized.  Greater than 50% stenosis of the bilateral common carotid arteries.  Greater than 50% stenosis of the proximal right external carotid artery.    CTA Head:  FINDINGS: Multiple contiguous axial images are obtained through the  [place] of [place] at 1 mm intervals following intravenous contrast  administration with reformatted images obtained in the sagittal and  coronal projections from the original data set. MIPS are also obtained.     There is attenuation of the size of the distal right vertebral artery.  The left vertebral artery is visualized distally but is also attenuated  and this may represent retrograde flow given the findings on the CT  angiogram of the neck of occlusion proximally with only intermittent  visualization of the left vertebral. The basilar artery is of very small  caliber. The superior cerebellar arteries are patent. Persistent fetal  origins provided the majority of flow to the posterior cerebral  arteries. There is a rudimentary P1 segment on the left.     The distal ICAs are widely patent. The anterior and middle cerebral  arteries are normal in caliber. No evidence of focal steno-occlusive  disease or discrete berry aneurysm.     IMPRESSION:  1.. The distal right vertebral artery is attenuated in caliber. The very  distal  left vertebral artery is visualized but this may represent  retrograde flow with more proximal extensive disease within the left  vertebral.. The basilar artery is also diminutive in size. The superior  cerebellar arteries demonstrate normal enhancement. There is a  rudimentary P1 segment of the left posterior cerebral artery. The  majority of flow to the posterior cerebral arteries is emanating from  the anterior circulation via persistent bilateral fetal origins.  2. The anterior circulation is unremarkable.    CTA Neck:  FINDINGS: Multiple contiguous axial images are obtained through the neck  from the aortic arch to the skull base at 1 mm intervals following  intravenous contrast administration with reformatted images obtained in  the sagittal and coronal projections from the original data set. MIPS  are also obtained. The lung apices are clear. The thyroid gland is  homogeneous in density. No evidence of adenopathy in the supraclavicular  fossa. No enlarged cervical chain lymphadenopathy is present. The  nasopharynx and parapharyngeal spaces are symmetric with no mass or mass  effect. The parotid and submandibular glands are normal in appearance.  The visualized paranasal sinuses are clear.     The aortic arch is normal in caliber. The origin of the great vessels  including the right vertebral artery are widely patent. The left  vertebral artery is occluded just distal to its origin. The lumen of the  vessel can be demonstrated and the enhancement present in the proximal  segment of the left vertebral is eccentric raising the possibility that  this may represent a vertebral artery dissection. The left vertebral  artery is demonstrated intermittently within the vertebral canal at the  C4-C5 level and again at the C3 level. The distal extracranial aspect of  the vertebral artery and intracranial aspect of the left vertebral  artery is diseased and markedly attenuated. The distal right vertebral  artery is also  attenuated in size. There is some flow within the very  distal aspect of the left vertebral artery intracranially perhaps  representing retrograde flow.     The carotid arteries are unremarkable including the common carotid  arteries, carotid bifurcations and extracranial ICAs with no focal  stenosis or plaquing.     IMPRESSION:  1.. 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 That  portion of the proximal vertebral artery which is visualized  demonstrates eccentric enhancement raising the possibility that this  represents a vertebral artery dissection. Left vertebral artery is noted  intermittently within the mid and upper cervical spine but is markedly  attenuated in its distal aspect including both the intra and  extracranial aspect. There is some enhancement of the very distal aspect  the left vertebral artery just proximal to the basilar I suspect this  may represent retrograde flow. There is also some attenuation of size of  the more distal right vertebral artery as well. The right vertebral  artery is otherwise patent.  2. The carotid arteries are unremarkable. This includes the carotid  bifurcations, common carotid arteries and ICAs.    TTE:  · Left ventricular systolic function is normal. Estimated EF = 60%.  · Left ventricular diastolic dysfunction.  · No evidence of pulmonary hypertension is present.  · No evidence of a patent foramen ovale. Saline test results are negative.       ASSESSMENT/PLAN    Diagnoses and all orders for this visit:    Cerebellar stroke (CMS/HCC)  -     US Carotid Bilateral; Future  -     TSH; Future  -     Lipid Panel; Future  -     Vitamin B12; Future  -     Holter Monitor - 48 Hour; Future  -     Ambulatory Referral to Vascular Surgery    Palpitations  -     TSH; Future  -     Holter Monitor - 48 Hour; Future    Stenosis of carotid artery, unspecified laterality  -     Ambulatory Referral to Vascular Surgery        Allergies and all known  medications/prescriptions have been reviewed using resources available on this encounter.    Patient's Body mass index is 27.44 kg/m². BMI is above normal parameters. Recommendations include: referral to primary care.    Return in about 4 weeks (around 2019).    MEDICAL DECISION MAKIN. Obtain labs today including lipid panel, TSH, Vitamin B12. Hypercoag panel discussed in the office and was negative for abnormalities.   2. Discussed TTE in office today which was negative for significant abnormalities, No PFO. Patient denies any SOB or chest pain. She does relate a history of palpitations. I would like to order a holter monitor to evaluate for possible undetected arrythmia.   3. Obtain labs as noted above. If her LDL is within goal range will decrease her Lipitor from 80 mg daily to 40 mg daily to maintain an LDL of less than 70. Given patients circulation and stenosis noted on carotid US I do recommend continuing with a high to moderate statin dose to continue to minimize plaque burden.   4. Continue ASA 81 mg daily. She denies any bleeding.   5. Patient had CT of head and MRI brain at [facility]. I would like to obtain these results and review. She denies any headache or visual disturbance on exam today.   6. Will refer to vascular for evaluation of CTA Head/Neck findings and carotid artery stenosis for input. I have placed an order for repeat carotid US in 6 months but will defer management to vascular.   7. Continue to increase activity as tolerated. No focal deficits noted today on exam.   8. Counseled on FAST as time saved is brain saved. She is to present to the ED with any signs of stroke including but not limited to vertigo, headache, unilateral weakness/numbness, vision changes, speech difficulties.   9. Discussed secondary stroke prevention to include a systolic blood pressure goal of less than 140, LDL goal less than 70, and 30-40 minutes of heart rate elevating exercise 3-4 times  per week. Continue antiplatelet.           Shahnaz Carney, APRN

## 2019-01-22 NOTE — PATIENT INSTRUCTIONS
Stroke Prevention  Some medical conditions and behaviors are associated with a higher chance of having a stroke. You can help prevent a stroke by making nutrition, lifestyle, and other changes, including managing any medical conditions you may have.  What nutrition changes can be made?  · Eat healthy foods. You can do this by:  ? Choosing foods high in fiber, such as fresh fruits and vegetables and whole grains.  ? Eating at least 5 or more servings of fruits and vegetables a day. Try to fill half of your plate at each meal with fruits and vegetables.  ? Choosing lean protein foods, such as lean cuts of meat, poultry without skin, fish, tofu, beans, and nuts.  ? Eating low-fat dairy products.  ? Avoiding foods that are high in salt (sodium). This can help lower blood pressure.  ? Avoiding foods that have saturated fat, trans fat, and cholesterol. This can help prevent high cholesterol.  ? Avoiding processed and premade foods.  · Follow your health care provider's specific guidelines for losing weight, controlling high blood pressure (hypertension), lowering high cholesterol, and managing diabetes. These may include:  ? Reducing your daily calorie intake.  ? Limiting your daily sodium intake to 1,500 milligrams (mg).  ? Using only healthy fats for cooking, such as olive oil, canola oil, or sunflower oil.  ? Counting your daily carbohydrate intake.  What lifestyle changes can be made?  · Maintain a healthy weight. Talk to your health care provider about your ideal weight.  · Get at least 30 minutes of moderate physical activity at least 5 days a week. Moderate activity includes brisk walking, biking, and swimming.  · Do not use any products that contain nicotine or tobacco, such as cigarettes and e-cigarettes. If you need help quitting, ask your health care provider. It may also be helpful to avoid exposure to secondhand smoke.  · Limit alcohol intake to no more than 1 drink a day for nonpregnant women and 2 drinks a  day for men. One drink equals 12 oz of beer, 5 oz of wine, or 1½ oz of hard liquor.  · Stop any illegal drug use.  · Avoid taking birth control pills. Talk to your health care provider about the risks of taking birth control pills if:  ? You are over 35 years old.  ? You smoke.  ? You get migraines.  ? You have ever had a blood clot.  What other changes can be made?  · Manage your cholesterol levels.  ? Eating a healthy diet is important for preventing high cholesterol. If cholesterol cannot be managed through diet alone, you may also need to take medicines.  ? Take any prescribed medicines to control your cholesterol as told by your health care provider.  · Manage your diabetes.  ? Eating a healthy diet and exercising regularly are important parts of managing your blood sugar. If your blood sugar cannot be managed through diet and exercise, you may need to take medicines.  ? Take any prescribed medicines to control your diabetes as told by your health care provider.  · Control your hypertension.  ? To reduce your risk of stroke, try to keep your blood pressure below 130/80.  ? Eating a healthy diet and exercising regularly are an important part of controlling your blood pressure. If your blood pressure cannot be managed through diet and exercise, you may need to take medicines.  ? Take any prescribed medicines to control hypertension as told by your health care provider.  ? Ask your health care provider if you should monitor your blood pressure at home.  ? Have your blood pressure checked every year, even if your blood pressure is normal. Blood pressure increases with age and some medical conditions.  · Get evaluated for sleep disorders (sleep apnea). Talk to your health care provider about getting a sleep evaluation if you snore a lot or have excessive sleepiness.  · Take over-the-counter and prescription medicines only as told by your health care provider. Aspirin or blood thinners (antiplatelets or  anticoagulants) may be recommended to reduce your risk of forming blood clots that can lead to stroke.  · Make sure that any other medical conditions you have, such as atrial fibrillation or atherosclerosis, are managed.  What are the warning signs of a stroke?  The warning signs of a stroke can be easily remembered as BEFAST.  · B is for balance. Signs include:  ? Dizziness.  ? Loss of balance or coordination.  ? Sudden trouble walking.  · E is for eyes. Signs include:  ? A sudden change in vision.  ? Trouble seeing.  · F is for face. Signs include:  ? Sudden weakness or numbness of the face.  ? The face or eyelid drooping to one side.  · A is for arms. Signs include:  ? Sudden weakness or numbness of the arm, usually on one side of the body.  · S is for speech. Signs include:  ? Trouble speaking (aphasia).  ? Trouble understanding.  · T is for time.  ? These symptoms may represent a serious problem that is an emergency. Do not wait to see if the symptoms will go away. Get medical help right away. Call your local emergency services (911 in the [place] Do not drive yourself to the hospital.  · Other signs of stroke may include:  ? A sudden, severe headache with no known cause.  ? Nausea or vomiting.  ? Seizure.    Where to find more information:  For more information, visit:  · American Stroke Association: www.strokeassociation.org  · National Stroke Association: www.stroke.org    Summary  · You can prevent a stroke by eating healthy, exercising, not smoking, limiting alcohol intake, and managing any medical conditions you may have.  · Do not use any products that contain nicotine or tobacco, such as cigarettes and e-cigarettes. If you need help quitting, ask your health care provider. It may also be helpful to avoid exposure to secondhand smoke.  · Remember BEFAST for warning signs of stroke. Get help right away if you or a loved one has any of these signs.  This information is not intended to replace advice given  to you by your health care provider. Make sure you discuss any questions you have with your health care provider.  Document Released: 01/25/2006 Document Revised: 01/23/2018 Document Reviewed: 01/23/2018  Elsevier Interactive Patient Education © 2018 [employer]

## 2019-01-23 DIAGNOSIS — M79.10 MYALGIA: Primary | ICD-10-CM

## 2019-01-23 LAB
ALBUMIN SERPL-MCNC: 4.4 G/DL (ref 3.5–5)
ALBUMIN/GLOB SERPL: 1.4 G/DL (ref 1.1–2.5)
ALP SERPL-CCNC: 78 U/L (ref 24–120)
ALT SERPL W P-5'-P-CCNC: 48 U/L (ref 0–54)
ANION GAP SERPL CALCULATED.3IONS-SCNC: 12 MMOL/L (ref 4–13)
AST SERPL-CCNC: 37 U/L (ref 7–45)
BILIRUB SERPL-MCNC: 0.5 MG/DL (ref 0.1–1)
BUN BLD-MCNC: 15 MG/DL (ref 5–21)
BUN/CREAT SERPL: 23.8 (ref 7–25)
CALCIUM SPEC-SCNC: 9.8 MG/DL (ref 8.4–10.4)
CHLORIDE SERPL-SCNC: 103 MMOL/L (ref 98–110)
CK SERPL-CCNC: 64 U/L (ref 0–203)
CO2 SERPL-SCNC: 27 MMOL/L (ref 24–31)
CREAT BLD-MCNC: 0.63 MG/DL (ref 0.5–1.4)
GFR SERPL CREATININE-BSD FRML MDRD: 100 ML/MIN/1.73
GLOBULIN UR ELPH-MCNC: 3.2 GM/DL
GLUCOSE BLD-MCNC: 88 MG/DL (ref 70–100)
POTASSIUM BLD-SCNC: 4.5 MMOL/L (ref 3.5–5.3)
PROT SERPL-MCNC: 7.6 G/DL (ref 6.3–8.7)
SODIUM BLD-SCNC: 142 MMOL/L (ref 135–145)

## 2019-01-24 ENCOUNTER — TELEPHONE (OUTPATIENT)
Dept: VASCULAR SURGERY | Facility: CLINIC | Age: 49
End: 2019-01-24

## 2019-01-24 PROBLEM — I65.09 VERTEBRAL ARTERY OCCLUSION: Status: ACTIVE | Noted: 2019-01-24

## 2019-01-24 NOTE — TELEPHONE ENCOUNTER
Attempted to leave a message advising of upcoming appointment for patient.  Her Voicemail box was full.  Appointment information was sent via the mail to the address on file.

## 2019-01-28 RX ORDER — ATORVASTATIN CALCIUM 40 MG/1
40 TABLET, FILM COATED ORAL DAILY
Qty: 30 TABLET | Refills: 1 | Status: SHIPPED | OUTPATIENT
Start: 2019-01-28 | End: 2019-04-17 | Stop reason: SDUPTHER

## 2019-02-01 DIAGNOSIS — I63.9 CEREBELLAR STROKE (HCC): Primary | ICD-10-CM

## 2019-02-04 ENCOUNTER — TELEPHONE (OUTPATIENT)
Dept: VASCULAR SURGERY | Facility: CLINIC | Age: 49
End: 2019-02-04

## 2019-02-04 NOTE — TELEPHONE ENCOUNTER
Called the mobile number to remind Mrs Lopez of her appointment for Tuesday, February 5th, 2019 at 1115 am with Dr Messer. Rang several times and stated the mailbox was full and cannot accept any new messages at this time.     Called the Home Number and left a message reminding Mrs Lopez of her appointment for Tuesday, February 5th, 2019 at 1115 am with Dr Messer. Also advised if she had any questions or needed to reschedule to please call the office at [phone].

## 2019-02-05 ENCOUNTER — HOSPITAL ENCOUNTER (OUTPATIENT)
Dept: ULTRASOUND IMAGING | Facility: HOSPITAL | Age: 49
Discharge: HOME | End: 2019-02-05
Admitting: NURSE PRACTITIONER

## 2019-02-05 ENCOUNTER — HOSPITAL ENCOUNTER (OUTPATIENT)
Dept: CARDIOLOGY | Facility: HOSPITAL | Age: 49
Discharge: HOME | End: 2019-02-05

## 2019-02-05 DIAGNOSIS — I63.9 CEREBELLAR STROKE (HCC): ICD-10-CM

## 2019-02-05 DIAGNOSIS — R00.2 PALPITATIONS: ICD-10-CM

## 2019-02-05 PROCEDURE — 93226 XTRNL ECG REC<48 HR SCAN A/R: CPT

## 2019-02-05 PROCEDURE — 93225 XTRNL ECG REC<48 HRS REC: CPT

## 2019-02-05 PROCEDURE — 93880 EXTRACRANIAL BILAT STUDY: CPT

## 2019-02-05 PROCEDURE — 93880 EXTRACRANIAL BILAT STUDY: CPT | Performed by: SURGERY

## 2019-02-08 RX ORDER — ASPIRIN 81 MG/1
TABLET, CHEWABLE ORAL
Qty: 30 TABLET | Refills: 11 | Status: SHIPPED | OUTPATIENT
Start: 2019-02-08 | End: 2019-07-05 | Stop reason: SDUPTHER

## 2019-02-13 ENCOUNTER — TELEPHONE (OUTPATIENT)
Dept: VASCULAR SURGERY | Facility: CLINIC | Age: 49
End: 2019-02-13

## 2019-02-13 NOTE — TELEPHONE ENCOUNTER
Called to remind Mrs Lopez of her appointment for Thursday, February 14th, 2019 at 1030 am with Dr Messer.     Rang several times and voicemail box is full

## 2019-02-14 ENCOUNTER — TELEPHONE (OUTPATIENT)
Dept: VASCULAR SURGERY | Facility: CLINIC | Age: 49
End: 2019-02-14

## 2019-02-14 NOTE — TELEPHONE ENCOUNTER
CALLED PT ON MOBILE V/M FULL. CALLED PT AT HOME AND LEFT MESS. JUST CHECKING IF SHE WAS RUNNING LATE OR IF SHE NEEDED TO RESCHEDULE. LEFT MESS FOR A RETURN CALL.

## 2019-02-18 PROCEDURE — 93227 XTRNL ECG REC<48 HR R&I: CPT | Performed by: INTERNAL MEDICINE

## 2019-03-18 ENCOUNTER — HOSPITAL ENCOUNTER (OUTPATIENT)
Dept: GENERAL RADIOLOGY | Facility: HOSPITAL | Age: 49
Discharge: HOME | End: 2019-03-18
Admitting: NURSE PRACTITIONER

## 2019-03-18 ENCOUNTER — OFFICE VISIT (OUTPATIENT)
Dept: INTERNAL MEDICINE | Facility: CLINIC | Age: 49
End: 2019-03-18

## 2019-03-18 VITALS
DIASTOLIC BLOOD PRESSURE: 86 MMHG | HEIGHT: 66 IN | BODY MASS INDEX: 28.37 KG/M2 | WEIGHT: 176.5 LBS | SYSTOLIC BLOOD PRESSURE: 124 MMHG | OXYGEN SATURATION: 97 % | RESPIRATION RATE: 16 BRPM | HEART RATE: 67 BPM | TEMPERATURE: 97.5 F

## 2019-03-18 DIAGNOSIS — M79.602 LEFT ARM PAIN: ICD-10-CM

## 2019-03-18 DIAGNOSIS — Z00.00 ENCOUNTER FOR WELLNESS EXAMINATION IN ADULT: Primary | ICD-10-CM

## 2019-03-18 DIAGNOSIS — Z12.4 SCREENING FOR CERVICAL CANCER: ICD-10-CM
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
## 2019-03-18 DIAGNOSIS — Z12.39 SCREENING FOR MALIGNANT NEOPLASM OF BREAST: ICD-10-CM

## 2019-03-18 PROCEDURE — 87624 HPV HI-RISK TYP POOLED RSLT: CPT | Performed by: NURSE PRACTITIONER

## 2019-03-18 PROCEDURE — G0123 SCREEN CERV/VAG THIN LAYER: HCPCS | Performed by: NURSE PRACTITIONER

## 2019-03-18 PROCEDURE — 99396 PREV VISIT EST AGE 40-64: CPT | Performed by: NURSE PRACTITIONER

## 2019-03-18 PROCEDURE — 73060 X-RAY EXAM OF HUMERUS: CPT

## 2019-03-18 PROCEDURE — 99214 OFFICE O/P EST MOD 30 MIN: CPT | Performed by: NURSE PRACTITIONER

## 2019-03-18 NOTE — PROGRESS NOTES
CC: pap smear, left arm pain    History:  Albina Lopez is a 48 y.o. female who presents today for evaluation of the above problems.    Patient presents today for pap smear. She reports that her periods are irregular. Her last period was in December but states she started today.   Denies any pain or vaginal odor.   She states that she is seeing primary care in [facility]. Graciela Roy. APRN.  Patient reports left arm pain. She reports that she fell up stairs landing on her elbows the last week of December. She did not seek medical attention at that time. Since then her pain has localized to the middle of her humerus. Tender to touch. When inactive her pain is a 2-3/10 but with picking up heavy objects or playing volleyball it is an 8/10.     ROS:  Review of Systems   Constitutional: Negative for fatigue and unexpected weight change.   HENT: Negative.    Eyes: Negative.    Respiratory: Negative.    Cardiovascular: Negative.    Gastrointestinal: Negative for abdominal pain, constipation and diarrhea.   Endocrine: Negative.    Genitourinary: Positive for vaginal bleeding. Negative for difficulty urinating, dyspareunia, genital sores, menstrual problem, pelvic pain, vaginal discharge and vaginal pain.   Musculoskeletal: Positive for arthralgias.   Skin: Negative.    Neurological: Negative.    Psychiatric/Behavioral: Negative.        No Known Allergies  Past Medical History:   Diagnosis Date   • Hypertension    • Stroke (CMS/HCC)      Past Surgical History:   Procedure Laterality Date   •  SECTION       Family History   Problem Relation Age of Onset   • Skin cancer Mother    • Heart disease Mother    • Hypertension Father    • Lung cancer Maternal Grandfather    • Skin cancer Paternal Grandmother       reports that she has quit smoking. she has never used smokeless tobacco. She reports that she drinks alcohol. She reports that she does not use drugs.      Current Outpatient Medications:   •  aspirin 81 MG  "chewable tablet, CHEW AND SWALLOW 1 TABLET BY MOUTH ONCE DAILY, Disp: 30 tablet, Rfl: 11  •  atorvastatin (LIPITOR) 40 MG tablet, Take 1 tablet by mouth Daily., Disp: 30 tablet, Rfl: 1  •  cetirizine (zyrTEC) 10 MG tablet, Take 10 mg by mouth Daily., Disp: , Rfl:   •  lisinopril (PRINIVIL,ZESTRIL) 10 MG tablet, Take 1 tablet by mouth Daily., Disp: 30 tablet, Rfl: 5    OBJECTIVE:  /86 (BP Location: Right arm, Patient Position: Sitting, Cuff Size: Adult)   Pulse 67   Temp 97.5 °F (36.4 °C) (Oral)   Resp 16   Ht 167.6 cm (66\")   Wt 80.1 kg (176 lb 8 oz)   SpO2 97%   BMI 28.49 kg/m²    Physical Exam   Constitutional: She is oriented to person, place, and time. She appears well-developed and well-nourished.   HENT:   Head: Normocephalic and atraumatic.   Eyes: EOM are normal. Pupils are equal, round, and reactive to light.   Neck: Normal range of motion. Neck supple.   Cardiovascular: Normal rate, regular rhythm and normal heart sounds.   Pulmonary/Chest: Effort normal and breath sounds normal.   Abdominal: Soft. Bowel sounds are normal.   Musculoskeletal: Normal range of motion.        Left upper arm: She exhibits tenderness and bony tenderness. She exhibits no swelling and no edema.   Neurological: She is alert and oriented to person, place, and time.   Skin: Skin is warm and dry.   Psychiatric: She has a normal mood and affect.   Vitals reviewed.      Assessment/Plan    Albina was seen today for gynecologic exam and fall.    Diagnoses and all orders for this visit:    Encounter for wellness examination in adult  Screening for malignant neoplasm of breast  -     Mammo Screening Bilateral With CAD; Future  Screening for cervical cancer  -     Liquid-based Pap Smear, Screening; Future  Immunizations:      - Tetanus: Unknown or >10 years ago. Recommend to have at pharmacy or on injury.      - Influenza: recommended annually      - Pneumovax:once after age 65      - Prevnar: Once after age 65      - Zostavax: " Once after age 60  Colonoscopy: Due at 50  Mammogram: Due in April 2019- order placed  PAP: collected today.   DEXA: DEXA scan at 64    Left arm pain  -     XR humerus left; Future  Fall (on) (from) other stairs and steps, initial encounter  -     XR humerus left; Future  Will further evaluate with imaging.       An After Visit Summary was printed and given to the patient at discharge.  Return in about 6 weeks (around 4/29/2019).         Genna Gagnon APRN 3/18/2019

## 2019-03-19 ENCOUNTER — TELEPHONE (OUTPATIENT)
Dept: INTERNAL MEDICINE | Facility: CLINIC | Age: 49
End: 2019-03-19

## 2019-03-19 NOTE — TELEPHONE ENCOUNTER
----- Message from PORFIRIO Alcantara sent at 3/18/2019  2:15 PM CDT -----  No fracture of her left arm. Would recommend PT if her symptoms persist. They can help determine if any muscle or ligament injury and help with pain relief.

## 2019-03-20 LAB
GEN CATEG CVX/VAG CYTO-IMP: NORMAL
HPV I/H RISK 4 DNA CVX QL PROBE+SIG AMP: NOT DETECTED
LAB AP CASE REPORT: NORMAL
LAB AP GYN ADDITIONAL INFORMATION: NORMAL
PATH INTERP SPEC-IMP: NORMAL
STAT OF ADQ CVX/VAG CYTO-IMP: NORMAL

## 2019-03-27 ENCOUNTER — TELEPHONE (OUTPATIENT)
Dept: VASCULAR SURGERY | Facility: CLINIC | Age: 49
End: 2019-03-27

## 2019-03-27 NOTE — TELEPHONE ENCOUNTER
Left message reminding Mrs Lopez of her appointment for Thursday, March 28th, 2019 at 1015 am with Dr Messer. Also advised if she had any questions or needed to reschedule please give the office a call at [phone].

## 2019-03-28 ENCOUNTER — OFFICE VISIT (OUTPATIENT)
Dept: VASCULAR SURGERY | Facility: CLINIC | Age: 49
End: 2019-03-28

## 2019-03-28 VITALS
HEART RATE: 102 BPM | OXYGEN SATURATION: 98 % | WEIGHT: 176 LBS | HEIGHT: 65 IN | SYSTOLIC BLOOD PRESSURE: 126 MMHG | DIASTOLIC BLOOD PRESSURE: 84 MMHG | BODY MASS INDEX: 29.32 KG/M2

## 2019-03-28 DIAGNOSIS — I65.02 OCCLUSION OF LEFT VERTEBRAL ARTERY: Primary | ICD-10-CM

## 2019-03-28 DIAGNOSIS — I10 ESSENTIAL HYPERTENSION: ICD-10-CM

## 2019-03-28 DIAGNOSIS — I63.9 CEREBELLAR STROKE (HCC): ICD-10-CM

## 2019-03-28 DIAGNOSIS — E78.2 MIXED HYPERLIPIDEMIA: ICD-10-CM

## 2019-03-28 PROCEDURE — 99204 OFFICE O/P NEW MOD 45 MIN: CPT | Performed by: SURGERY

## 2019-03-28 NOTE — PROGRESS NOTES
2019      Shahnaz Carney APRN  [address]  [place] [address]    Albina Lopez  1970    Chief Complaint   Patient presents with   • Establish Care     Referred over by Shahnaz MONROY for Carotid Stenosis. Test 19  Carotid Bilateral . Patient denies any stroke like symptoms.        Dear PORFIRIO Dent    HPI  I had the pleasure of seeing your patient Albina Lopez in the office today.  Thank you kindly for this consultation.  As you recall, Albina Lopez is a 48 y.o.  female who you are currently following for a cerebellar stroke.  She reports in December she stated room was spinning, then followed with nausea and vomiting.  For 3 days she continued to have symptoms, and ultimately sent to the ED.  She is maintained on aspirin and Lipitor.  She did have noninvasive testing performed, which I did review in office.      Past Medical History:   Diagnosis Date   • Hypertension    • Stroke (CMS/HCC)        Past Surgical History:   Procedure Laterality Date   •  SECTION         Family History   Problem Relation Age of Onset   • Skin cancer Mother    • Heart disease Mother    • Hypertension Father    • Lung cancer Maternal Grandfather    • Skin cancer Paternal Grandmother        Social History     Socioeconomic History   • Marital status:      Spouse name: Not on file   • Number of children: Not on file   • Years of education: Not on file   • Highest education level: Not on file   Tobacco Use   • Smoking status: Former Smoker   • Smokeless tobacco: Never Used   Substance and Sexual Activity   • Alcohol use: Yes     Comment: 3 TIMES A WEEK    • Drug use: No   • Sexual activity: Yes     Partners: Male       No Known Allergies    Prior to Admission medications    Medication Sig Start Date End Date Taking? Authorizing Provider   aspirin 81 MG chewable tablet CHEW AND SWALLOW 1 TABLET BY MOUTH ONCE DAILY 19  Yes Izabel Jackson APRN  "  atorvastatin (LIPITOR) 40 MG tablet Take 1 tablet by mouth Daily. 1/28/19  Yes Shahnaz Carney APRN   cetirizine (zyrTEC) 10 MG tablet Take 10 mg by mouth Daily.   Yes Provider, MD Collette   lisinopril (PRINIVIL,ZESTRIL) 10 MG tablet Take 1 tablet by mouth Daily. 12/18/18  Yes Bunny Julian DO       Review of Systems   Constitutional: Negative.    HENT: Negative.    Eyes: Negative.    Respiratory: Negative.    Cardiovascular: Negative.    Gastrointestinal: Negative.    Endocrine: Negative.    Genitourinary: Negative.    Musculoskeletal: Negative.    Skin: Negative.    Allergic/Immunologic: Negative.    Neurological: Negative.    Hematological: Negative.    Psychiatric/Behavioral: Negative.    All other systems reviewed and are negative.    /84 (BP Location: Right arm, Patient Position: Sitting, Cuff Size: Adult)   Pulse 102   Ht 165.1 cm (65\")   Wt 79.8 kg (176 lb)   SpO2 98%   BMI 29.29 kg/m²     Physical Exam   Constitutional: She is oriented to person, place, and time. She appears well-developed and well-nourished.   HENT:   Head: Normocephalic and atraumatic.   Eyes: Pupils are equal, round, and reactive to light. No scleral icterus.   Neck: Neck supple. No JVD present. Carotid bruit is not present. No thyromegaly present.   Cardiovascular: Normal rate, regular rhythm and normal heart sounds.   Pulses:       Carotid pulses are 2+ on the right side, and 2+ on the left side.       Femoral pulses are 2+ on the right side, and 2+ on the left side.       Popliteal pulses are 2+ on the right side, and 2+ on the left side.        Dorsalis pedis pulses are 2+ on the right side, and 2+ on the left side.        Posterior tibial pulses are 2+ on the right side, and 2+ on the left side.   Pulmonary/Chest: Effort normal and breath sounds normal.   Abdominal: Soft. Bowel sounds are normal. She exhibits no distension, no abdominal bruit and no mass. There is no hepatosplenomegaly. There is no " tenderness.   Musculoskeletal: Normal range of motion. She exhibits no edema.   Lymphadenopathy:     She has no cervical adenopathy.   Neurological: She is alert and oriented to person, place, and time. She has normal strength. No cranial nerve deficit or sensory deficit.   Skin: Skin is warm, dry and intact.   Psychiatric: She has a normal mood and affect. Her behavior is normal. Judgment and thought content normal.   Nursing note and vitals reviewed.      Diagnostic data:  EXAMINATION: CT angiogram of the neck with contrast 12/8/2018     DOSE: 201.5 mGycm. Automated exposure control was utilized to diminish  patient radiation dose..     HISTORY: Stroke.     FINDINGS: Multiple contiguous axial images are obtained through the neck  from the aortic arch to the skull base at 1 mm intervals following  intravenous contrast administration with reformatted images obtained in  the sagittal and coronal projections from the original data set. MIPS  are also obtained. The lung apices are clear. The thyroid gland is  homogeneous in density. No evidence of adenopathy in the supraclavicular  fossa. No enlarged cervical chain lymphadenopathy is present. The  nasopharynx and parapharyngeal spaces are symmetric with no mass or mass  effect. The parotid and submandibular glands are normal in appearance.  The visualized paranasal sinuses are clear.     The aortic arch is normal in caliber. The origin of the great vessels  including the right vertebral artery are widely patent. The left  vertebral artery is occluded just distal to its origin. The lumen of the  vessel can be demonstrated and the enhancement present in the proximal  segment of the left vertebral is eccentric raising the possibility that  this may represent a vertebral artery dissection. The left vertebral  artery is demonstrated intermittently within the vertebral canal at the  C4-C5 level and again at the C3 level. The distal extracranial aspect of  the vertebral  artery and intracranial aspect of the left vertebral  artery is diseased and markedly attenuated. The distal right vertebral  artery is also attenuated in size. There is some flow within the very  distal aspect of the left vertebral artery intracranially perhaps  representing retrograde flow.     The carotid arteries are unremarkable including the common carotid  arteries, carotid bifurcations and extracranial ICAs with no focal  stenosis or plaquing.     IMPRESSION:  1.. 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 That  portion of the proximal vertebral artery which is visualized  demonstrates eccentric enhancement raising the possibility that this  represents a vertebral artery dissection. Left vertebral artery is noted  intermittently within the mid and upper cervical spine but is markedly  attenuated in its distal aspect including both the intra and  extracranial aspect. There is some enhancement of the very distal aspect  the left vertebral artery just proximal to the basilar I suspect this  may represent retrograde flow. There is also some attenuation of size of  the more distal right vertebral artery as well. The right vertebral  artery is otherwise patent.  2. The carotid arteries are unremarkable. This includes the carotid  bifurcations, common carotid arteries and ICAs.  This report was finalized on 12/08/2018 15:16 by Dr. Will Johnston MD.       Patient Active Problem List   Diagnosis   • Cerebellar stroke (CMS/HCC)   • Essential hypertension   • Mixed hyperlipidemia   • Overweight (BMI 25.0-29.9)   • Vertebral artery occlusion        Diagnosis Plan   1. Occlusion of left vertebral artery     2. Cerebellar stroke (CMS/HCC)     3. Essential hypertension     4. Mixed hyperlipidemia         Plan: After thoroughly evaluating Albina Lopez, I believe the best course of action is to remain conservative from a vascular surgery standpoint.  I carefully reviewed her testing and no further  surgical treatment is necessary at this time.  She will be maintained from a medical standpoint with medications.  She can follow-up going forward on a as needed basis. I did discuss vascular risk factors as they pertain to the progression of vascular disease including controlling hypertension and hyperlipidemia.  The patient is to continue taking their medications as previously discussed.   This was all discussed in full with complete understanding.  Thank you for allowing me to participate in the care of your patient.  Please do not hesitate to call with any questions or concerns.  We will keep you aware of any further encounters with Albina Tafoyaburen.        Sincerely yours,         Jonas Messer, Bunny Rebollar, DO

## 2019-04-17 RX ORDER — ATORVASTATIN CALCIUM 40 MG/1
TABLET, FILM COATED ORAL
Qty: 30 TABLET | Refills: 0 | Status: SHIPPED | OUTPATIENT
Start: 2019-04-17 | End: 2019-05-22 | Stop reason: SDUPTHER

## 2019-05-01 ENCOUNTER — OFFICE VISIT (OUTPATIENT)
Dept: NEUROLOGY | Facility: CLINIC | Age: 49
End: 2019-05-01

## 2019-05-01 ENCOUNTER — LAB (OUTPATIENT)
Dept: LAB | Facility: HOSPITAL | Age: 49
End: 2019-05-01

## 2019-05-01 ENCOUNTER — HOSPITAL ENCOUNTER (OUTPATIENT)
Dept: MAMMOGRAPHY | Facility: HOSPITAL | Age: 49
Discharge: HOME | End: 2019-05-01
Admitting: NURSE PRACTITIONER

## 2019-05-01 ENCOUNTER — HOSPITAL ENCOUNTER (OUTPATIENT)
Dept: CT IMAGING | Facility: HOSPITAL | Age: 49
Discharge: HOME | End: 2019-05-01

## 2019-05-01 VITALS
SYSTOLIC BLOOD PRESSURE: 132 MMHG | HEART RATE: 78 BPM | WEIGHT: 173 LBS | DIASTOLIC BLOOD PRESSURE: 80 MMHG | BODY MASS INDEX: 28.82 KG/M2 | HEIGHT: 65 IN

## 2019-05-01 DIAGNOSIS — R42 DIZZINESS: ICD-10-CM

## 2019-05-01 DIAGNOSIS — I10 ESSENTIAL HYPERTENSION: ICD-10-CM

## 2019-05-01 DIAGNOSIS — I63.9 CEREBELLAR STROKE (HCC): Primary | ICD-10-CM

## 2019-05-01 DIAGNOSIS — I63.9 CEREBELLAR STROKE (HCC): ICD-10-CM

## 2019-05-01 DIAGNOSIS — G89.29 CHRONIC LEFT SHOULDER PAIN: ICD-10-CM

## 2019-05-01 DIAGNOSIS — Z12.39 SCREENING FOR MALIGNANT NEOPLASM OF BREAST: ICD-10-CM

## 2019-05-01 DIAGNOSIS — M25.512 CHRONIC LEFT SHOULDER PAIN: ICD-10-CM

## 2019-05-01 DIAGNOSIS — E78.2 MIXED HYPERLIPIDEMIA: ICD-10-CM

## 2019-05-01 LAB
ALBUMIN SERPL-MCNC: 4.6 G/DL (ref 3.5–5)
ALBUMIN/GLOB SERPL: 1.4 G/DL (ref 1.1–2.5)
ALP SERPL-CCNC: 88 U/L (ref 24–120)
ALT SERPL W P-5'-P-CCNC: 73 U/L (ref 0–54)
ANION GAP SERPL CALCULATED.3IONS-SCNC: 7 MMOL/L (ref 4–13)
AST SERPL-CCNC: 59 U/L (ref 7–45)
AT III PPP CHRO-ACNC: 109 % (ref 84–123)
BILIRUB SERPL-MCNC: 0.9 MG/DL (ref 0.1–1)
BUN BLD-MCNC: 14 MG/DL (ref 5–21)
BUN/CREAT SERPL: 20.9 (ref 7–25)
CALCIUM SPEC-SCNC: 9.6 MG/DL (ref 8.4–10.4)
CHLORIDE SERPL-SCNC: 102 MMOL/L (ref 98–110)
CO2 SERPL-SCNC: 29 MMOL/L (ref 24–31)
CREAT BLD-MCNC: 0.67 MG/DL (ref 0.5–1.4)
DEPRECATED RDW RBC AUTO: 41.4 FL (ref 40–54)
ERYTHROCYTE [DISTWIDTH] IN BLOOD BY AUTOMATED COUNT: 13.3 % (ref 12–15)
GFR SERPL CREATININE-BSD FRML MDRD: 94 ML/MIN/1.73
GLOBULIN UR ELPH-MCNC: 3.3 GM/DL
GLUCOSE BLD-MCNC: 84 MG/DL (ref 70–100)
HCT VFR BLD AUTO: 44.2 % (ref 37–47)
HGB BLD-MCNC: 14.4 G/DL (ref 12–16)
MAGNESIUM SERPL-MCNC: 2.1 MG/DL (ref 1.4–2.2)
MCH RBC QN AUTO: 27.4 PG (ref 28–32)
MCHC RBC AUTO-ENTMCNC: 32.6 G/DL (ref 33–36)
MCV RBC AUTO: 84.2 FL (ref 82–98)
PLATELET # BLD AUTO: 339 10*3/MM3 (ref 130–400)
PMV BLD AUTO: 10.8 FL (ref 6–12)
POTASSIUM BLD-SCNC: 4.8 MMOL/L (ref 3.5–5.3)
PROT SERPL-MCNC: 7.9 G/DL (ref 6.3–8.7)
RBC # BLD AUTO: 5.25 10*6/MM3 (ref 4.2–5.4)
SODIUM BLD-SCNC: 138 MMOL/L (ref 135–145)
WBC NRBC COR # BLD: 7.5 10*3/MM3 (ref 4.8–10.8)

## 2019-05-01 PROCEDURE — 86147 CARDIOLIPIN ANTIBODY EA IG: CPT | Performed by: NURSE PRACTITIONER

## 2019-05-01 PROCEDURE — 70450 CT HEAD/BRAIN W/O DYE: CPT

## 2019-05-01 PROCEDURE — 85027 COMPLETE CBC AUTOMATED: CPT | Performed by: CLINICAL NURSE SPECIALIST

## 2019-05-01 PROCEDURE — 83735 ASSAY OF MAGNESIUM: CPT | Performed by: CLINICAL NURSE SPECIALIST

## 2019-05-01 PROCEDURE — 80053 COMPREHEN METABOLIC PANEL: CPT | Performed by: CLINICAL NURSE SPECIALIST

## 2019-05-01 PROCEDURE — 85732 THROMBOPLASTIN TIME PARTIAL: CPT | Performed by: NURSE PRACTITIONER

## 2019-05-01 PROCEDURE — 85300 ANTITHROMBIN III ACTIVITY: CPT | Performed by: NURSE PRACTITIONER

## 2019-05-01 PROCEDURE — 85220 BLOOC CLOT FACTOR V TEST: CPT | Performed by: NURSE PRACTITIONER

## 2019-05-01 PROCEDURE — 77067 SCR MAMMO BI INCL CAD: CPT

## 2019-05-01 PROCEDURE — 85613 RUSSELL VIPER VENOM DILUTED: CPT | Performed by: NURSE PRACTITIONER

## 2019-05-01 PROCEDURE — 85302 CLOT INHIBIT PROT C ANTIGEN: CPT | Performed by: NURSE PRACTITIONER

## 2019-05-01 PROCEDURE — 99214 OFFICE O/P EST MOD 30 MIN: CPT | Performed by: CLINICAL NURSE SPECIALIST

## 2019-05-01 PROCEDURE — 85305 CLOT INHIBIT PROT S TOTAL: CPT | Performed by: NURSE PRACTITIONER

## 2019-05-01 PROCEDURE — 77063 BREAST TOMOSYNTHESIS BI: CPT

## 2019-05-01 PROCEDURE — 81240 F2 GENE: CPT | Performed by: NURSE PRACTITIONER

## 2019-05-01 PROCEDURE — 36415 COLL VENOUS BLD VENIPUNCTURE: CPT

## 2019-05-01 PROCEDURE — 85306 CLOT INHIBIT PROT S FREE: CPT | Performed by: NURSE PRACTITIONER

## 2019-05-01 PROCEDURE — 83090 ASSAY OF HOMOCYSTEINE: CPT | Performed by: NURSE PRACTITIONER

## 2019-05-01 NOTE — PROGRESS NOTES
Subjective     Chief Complaint   Patient presents with   • Stroke     No new stroke symptoms. She has noticed some dizziness in the last few weeks but she is unsure if this is sinus/allergy related. No LOC. A few minor HA- controlled with OTC meds       Albina Lopez is a 48 y.o. 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 . She is by herself. She is ambulating unassisted. She was last seen by VIPUL Carney APRN 1/2019. She has done well and reports no residual effects of stroke but reports that in last 2-3 weeks has been have some dizziness, specifically when she closes her eyes will have spinning sensation. She denies unilateral weakness/numbness, speech/vision changes. At time of stroke she also has severe HA. Since stroke has has bilateral occpital HA about 3 times per week.  12/2018 did undergo stroke work up including hypercoaguable panel that was negative. Was to have repeat panel 2/2019 but has not had done. She continues with ASA 81 mg daily and denies bleeding. Continues with lipitor 40 mg daily, denies myalgias. BP controlled with Lisinopril 10 mg daily. Tells me the dizziness she is now having is nothing similar to when she had stroke 12/2018. Patient has been attributing dizziness to allergy season as well as changes in diet as she has gained about 20 pounds since stroke and has altered her diet and disturbed sleep as she has been under stress at work for meeting quality goals/surveys.   Patient was referred to [facility] vascular team for input of carotid stenosis. She did see Dr. Messer and per his note will follow up PRN.     She tells me at time of stroke, denies chiropractor, falls. She was playing volleyball during that time and denies diving for the ball or striking her head. She has had ball to face in February and march 2019 but no apparent injury. There is family history of  stroke in her grandmother around age 50s.   Grand mother had ministrokes around age 50     She does have a new complaint of left shoulder/upper arm pain. She did have xray left humerus that was negative for fracture but did show 1 cm lytic lesion. After her stroke she was walking down the stairs and had her hands full. She missed counting the steps, missing the last step and fell, landing on her left shoulder/arm. initialy did not have pain but since then, and more so when she was playing volleyball would have difficulty raising her left arm above her head. If she grabs her left elbow when lifting her left arm there is no pain with movement. PCP had recommended PT but patient has not yet started.        Stroke    Chronicity: midline cerebellar stroke.  Episode onset: 2018.  The problem has been resolved. Associated symptoms comments: Vertigo, right arm and leg discrimination, headache, some dysphagia initially.. Exacerbated by: HTN, vertebral artery occlusion.  Treatments tried: asa, statin, BP control.  The treatment provided significant relief.        Current Outpatient Medications   Medication Sig Dispense Refill   • aspirin 81 MG chewable tablet CHEW AND SWALLOW 1 TABLET BY MOUTH ONCE DAILY 30 tablet 11   • atorvastatin (LIPITOR) 40 MG tablet TAKE 1 TABLET BY MOUTH ONCE DAILY 30 tablet 0   • cetirizine (zyrTEC) 10 MG tablet Take 10 mg by mouth Daily.     • lisinopril (PRINIVIL,ZESTRIL) 10 MG tablet Take 1 tablet by mouth Daily. 30 tablet 5     No current facility-administered medications for this visit.        Past Medical History:   Diagnosis Date   • Hypertension    • Stroke (CMS/HCC)        Past Surgical History:   Procedure Laterality Date   •  SECTION         family history includes Heart disease in her mother; Hypertension in her father; Lung cancer in her maternal grandfather; No Known Problems in her brother, daughter, maternal aunt, maternal grandmother, paternal aunt, paternal grandfather,  "sister, and son; Skin cancer in her mother and paternal grandmother.    Social History     Tobacco Use   • Smoking status: Former Smoker   • Smokeless tobacco: Never Used   Substance Use Topics   • Alcohol use: Yes     Comment: 3 TIMES A WEEK    • Drug use: No       Review of Systems   Constitutional: Negative.    HENT: Negative.    Eyes: Negative.    Respiratory: Negative.    Cardiovascular: Negative.    Gastrointestinal: Negative.    Endocrine: Negative.    Genitourinary: Negative.    Skin: Negative.    Allergic/Immunologic: Negative.    Neurological: Negative.    Hematological: Negative.    Psychiatric/Behavioral: Negative.    All other systems reviewed and are negative.      Objective     /80   Pulse 78   Ht 165.1 cm (65\")   Wt 78.5 kg (173 lb)   Breastfeeding? No   BMI 28.79 kg/m²  , Body mass index is 28.79 kg/m².    Physical Exam   Constitutional: She is oriented to person, place, and time. Vital signs are normal. She appears well-developed and well-nourished. She is cooperative.   HENT:   Head: Normocephalic and atraumatic.   Right Ear: Hearing and external ear normal.   Left Ear: Hearing and external ear normal.   Nose: Nose normal.   Mouth/Throat: Oropharynx is clear and moist.   Eyes: Conjunctivae, EOM and lids are normal. Pupils are equal, round, and reactive to light. Right eye exhibits normal extraocular motion and no nystagmus. Left eye exhibits normal extraocular motion and no nystagmus. Right pupil is round and reactive. Left pupil is round and reactive. Pupils are equal.   Neck: Normal range of motion. Neck supple. Carotid bruit is not present.   Cardiovascular: Normal rate, regular rhythm and normal heart sounds.   No murmur heard.  Pulmonary/Chest: Effort normal and breath sounds normal. She has no decreased breath sounds. She has no rhonchi.   Abdominal: Soft. Bowel sounds are normal.   Musculoskeletal:        Left shoulder: She exhibits decreased range of motion, tenderness, bony " tenderness and pain.   Neurological: She is alert and oriented to person, place, and time. She has normal strength and normal reflexes. She displays no tremor. No cranial nerve deficit or sensory deficit. She exhibits normal muscle tone. She displays a negative Romberg sign. She displays no seizure activity. Coordination and gait normal.   Reflex Scores:       Tricep reflexes are 2+ on the right side and 2+ on the left side.       Bicep reflexes are 2+ on the right side and 2+ on the left side.       Brachioradialis reflexes are 2+ on the right side and 2+ on the left side.       Patellar reflexes are 2+ on the right side and 2+ on the left side.       Achilles reflexes are 2+ on the right side and 2+ on the left side.  Awake, alert. No aphasia, no dysarthria  Completes simple and complex commands    CN II:  Visual fields full.  Pupils equally reactive to light  CN III, IV, VI:  Extraocular Muscles full with no signs of nystagmus  CN V:  Facial sensory is symmetric with no asymetries.  CN VII:  Facial motor symmetric  CN VIII:  Gross hearing intact bilaterally  CN IX:  Palate elevates symmetrically  CN X:  Palate elevates symmetrically  CN XI:  Shoulder shrug symmetric  CN XII:  Tongue is midline on protrusion    Full and symmetric strength bilateral upper and lower extremities.   Skin: Skin is warm and dry.   Psychiatric: She has a normal mood and affect. Her speech is normal and behavior is normal. Cognition and memory are normal.   Nursing note and vitals reviewed.      Results for orders placed or performed in visit on 03/18/19   HPV DNA Probe, Direct - ThinPrep Vial, Cervix   Result Value Ref Range    HPV Aptima Not Detected Not Detected   Liquid-based Pap Smear, Screening   Result Value Ref Range    Case Report       Gynecologic Cytology Report                       Case: [id number]                                  Authorizing Provider:  Genna Gagnon APRN         Collected:           03/18/2019 11:00 AM           Ordering Location:     [facility]     Received:            03/19/2019 06:47 AM                                 [facility]                                                                           MEDICINE                                                                     First Screen:          Lelo Caruso                                                               Specimen:    Liquid-Based Pap, Screening, Cervix                                                        Interpretation Negative for intraepithelial lesion or malignancy      General Categorization Within normal limits     Specimen Adequacy Satisfactory for evaluation     Additional Information       Disclaimer: Cervical cytology is a screening test primarily for squamous cancer and its precursors and has associated false-negative and false-positive results.  Technologies such as liquid-based preparations may decrease but will not eliminate all false-negative results.  Follow-up of unexplained clinical signs and symptoms is recommended to minimize false-negative results. (The [place] System for Reporting Cervical Cytology: [place], 2015).        XRAY LEFT HUMERUS: IMPRESSION:  1. No evidence of acute fracture.  2. Small circumscribed lytic bone lesion has benign features.  This report was finalized on 03/18/2019 13:00 by Dr. Rangel Collins MD.      MRI BRAIN: IMPRESSION:  1. Restricted diffusion within the inferior left cerebellar hemisphere,  left cerebellar tonsil and vermis. On the lower most cut of today's  diffusion study there also appears to be some ischemia within the right  cerebellar tonsil although there is no associated abnormal T2 signal on  the more heavily T2 weighted sequences in this distribution. This  infarct is at least a few days in age with associated sulcal effacement  and edema. There is no significant mass effect or shift of the midline.  There is no associated abnormal contrast enhancement or  hemorrhage. This  infarct is within the posterior inferior cerebellar artery territory.  2. Scattered foci of T2 abnormality suggesting mild small vessel  disease. The brain is otherwise unremarkable.       CAROTID DUPLEX 2/2019: IMPRESSION:  Impression:  1. There is less than 50% stenosis of the right internal carotid artery.  2. There is 50-69% stenosis of the left internal carotid artery.  3. Antegrade flow is demonstrated in the right vertebral artery. The  left vertebral artery was unable to be visualized.    CTA HEAD: IMPRESSION:  1.. The distal right vertebral artery is attenuated in caliber. The very  distal left vertebral artery is visualized but this may represent  retrograde flow with more proximal extensive disease within the left  vertebral.. The basilar artery is also diminutive in size. The superior  cerebellar arteries demonstrate normal enhancement. There is a  rudimentary P1 segment of the left posterior cerebral artery. The  majority of flow to the posterior cerebral arteries is emanating from  the anterior circulation via persistent bilateral fetal origins.  2. The anterior circulation is unremarkable.  This report was finalized on 12/08/2018 15:26 by Dr. Will Johnston MD.      CTA NECK:   IMPRESSION:  1.. 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 That  portion of the proximal vertebral artery which is visualized  demonstrates eccentric enhancement raising the possibility that this  represents a vertebral artery dissection. Left vertebral artery is noted  intermittently within the mid and upper cervical spine but is markedly  attenuated in its distal aspect including both the intra and  extracranial aspect. There is some enhancement of the very distal aspect  the left vertebral artery just proximal to the basilar I suspect this  may represent retrograde flow. There is also some attenuation of size of  the more distal right vertebral artery as well. The right  vertebral  artery is otherwise patent.  2. The carotid arteries are unremarkable. This includes the carotid  bifurcations, common carotid arteries and ICAs.  This report was finalized on 2018 15:16 by Dr. Will Johnston MD.      ASSESSMENT/PLAN    Diagnoses and all orders for this visit:    Cerebellar stroke (CMS/HCC)    Chronic left shoulder pain  -     MRI shoulder left w contrast; Future  -     MRI shoulder left wo contrast; Future    Dizziness  -     CBC (No Diff); Future  -     Comprehensive Metabolic Panel; Future  -     Magnesium; Future  -     CT Head Without Contrast; Future    Essential hypertension    Mixed hyperlipidemia      MEDICAL DECISION MAKIN. Continue ASA 81 mg daily for secondary stroke prevention  2. Continue Lipitor 40 mg daily per PCP for LDL goal less than 70. Even in LDL is low end normal will recommend statin therapy for neuro protrective properties.  3. BP management per PCP for systolic goal less than 130.  4. Obtain CT head today as patient having new dizziness. Would like to rule out new stroke or expansiion of previous stroke. May also be exacerbation of previous symptoms related to disturbed sleep or other cause.   5. Check labs CBc, CMP, magnesium level and repeat hypercoag panel previously ordered.  6. MRI left shoulder and left humerus  7.  Patient's Body mass index is 28.79 kg/m². BMI is above normal parameters. Recommendations include: educational material.  8. Patient is counseled on stroke signs and symptoms using FAST and Time Saved is Brain Saved.       allergies and all known medications/prescriptions have been reviewed using resources available on this encounter.    Return in about 6 months (around 2019).        PORFIRIO Navarrete

## 2019-05-01 NOTE — PATIENT INSTRUCTIONS
Stroke Prevention  Some medical conditions and behaviors are associated with a higher chance of having a stroke. You can help prevent a stroke by making nutrition, lifestyle, and other changes, including managing any medical conditions you may have.  What nutrition changes can be made?  · Eat healthy foods. You can do this by:  ? Choosing foods high in fiber, such as fresh fruits and vegetables and whole grains.  ? Eating at least 5 or more servings of fruits and vegetables a day. Try to fill half of your plate at each meal with fruits and vegetables.  ? Choosing lean protein foods, such as lean cuts of meat, poultry without skin, fish, tofu, beans, and nuts.  ? Eating low-fat dairy products.  ? Avoiding foods that are high in salt (sodium). This can help lower blood pressure.  ? Avoiding foods that have saturated fat, trans fat, and cholesterol. This can help prevent high cholesterol.  ? Avoiding processed and premade foods.  · Follow your health care provider's specific guidelines for losing weight, controlling high blood pressure (hypertension), lowering high cholesterol, and managing diabetes. These may include:  ? Reducing your daily calorie intake.  ? Limiting your daily sodium intake to 1,500 milligrams (mg).  ? Using only healthy fats for cooking, such as olive oil, canola oil, or sunflower oil.  ? Counting your daily carbohydrate intake.  What lifestyle changes can be made?  · Maintain a healthy weight. Talk to your health care provider about your ideal weight.  · Get at least 30 minutes of moderate physical activity at least 5 days a week. Moderate activity includes brisk walking, biking, and swimming.  · Do not use any products that contain nicotine or tobacco, such as cigarettes and e-cigarettes. If you need help quitting, ask your health care provider. It may also be helpful to avoid exposure to secondhand smoke.  · Limit alcohol intake to no more than 1 drink a day for nonpregnant women and 2 drinks a  day for men. One drink equals 12 oz of beer, 5 oz of wine, or 1½ oz of hard liquor.  · Stop any illegal drug use.  · Avoid taking birth control pills. Talk to your health care provider about the risks of taking birth control pills if:  ? You are over 35 years old.  ? You smoke.  ? You get migraines.  ? You have ever had a blood clot.  What other changes can be made?  · Manage your cholesterol levels.  ? Eating a healthy diet is important for preventing high cholesterol. If cholesterol cannot be managed through diet alone, you may also need to take medicines.  ? Take any prescribed medicines to control your cholesterol as told by your health care provider.  · Manage your diabetes.  ? Eating a healthy diet and exercising regularly are important parts of managing your blood sugar. If your blood sugar cannot be managed through diet and exercise, you may need to take medicines.  ? Take any prescribed medicines to control your diabetes as told by your health care provider.  · Control your hypertension.  ? To reduce your risk of stroke, try to keep your blood pressure below 130/80.  ? Eating a healthy diet and exercising regularly are an important part of controlling your blood pressure. If your blood pressure cannot be managed through diet and exercise, you may need to take medicines.  ? Take any prescribed medicines to control hypertension as told by your health care provider.  ? Ask your health care provider if you should monitor your blood pressure at home.  ? Have your blood pressure checked every year, even if your blood pressure is normal. Blood pressure increases with age and some medical conditions.  · Get evaluated for sleep disorders (sleep apnea). Talk to your health care provider about getting a sleep evaluation if you snore a lot or have excessive sleepiness.  · Take over-the-counter and prescription medicines only as told by your health care provider. Aspirin or blood thinners (antiplatelets or  anticoagulants) may be recommended to reduce your risk of forming blood clots that can lead to stroke.  · Make sure that any other medical conditions you have, such as atrial fibrillation or atherosclerosis, are managed.  What are the warning signs of a stroke?  The warning signs of a stroke can be easily remembered as BEFAST.  · B is for balance. Signs include:  ? Dizziness.  ? Loss of balance or coordination.  ? Sudden trouble walking.  · E is for eyes. Signs include:  ? A sudden change in vision.  ? Trouble seeing.  · F is for face. Signs include:  ? Sudden weakness or numbness of the face.  ? The face or eyelid drooping to one side.  · A is for arms. Signs include:  ? Sudden weakness or numbness of the arm, usually on one side of the body.  · S is for speech. Signs include:  ? Trouble speaking (aphasia).  ? Trouble understanding.  · T is for time.  ? These symptoms may represent a serious problem that is an emergency. Do not wait to see if the symptoms will go away. Get medical help right away. Call your local emergency services (911 in the [place] Do not drive yourself to the hospital.  · Other signs of stroke may include:  ? A sudden, severe headache with no known cause.  ? Nausea or vomiting.  ? Seizure.    Where to find more information  For more information, visit:  · American Stroke Association: www.strokeassociation.org  · National Stroke Association: www.stroke.org    Summary  · You can prevent a stroke by eating healthy, exercising, not smoking, limiting alcohol intake, and managing any medical conditions you may have.  · Do not use any products that contain nicotine or tobacco, such as cigarettes and e-cigarettes. If you need help quitting, ask your health care provider. It may also be helpful to avoid exposure to secondhand smoke.  · Remember BEFAST for warning signs of stroke. Get help right away if you or a loved one has any of these signs.  This information is not intended to replace advice given to  you by your health care provider. Make sure you discuss any questions you have with your health care provider.  Document Released: 01/25/2006 Document Revised: 01/23/2018 Document Reviewed: 01/23/2018  [employer] Interactive Patient Education © 2019 [employer]  BMI for Adults  Body mass index (BMI) is a number that is calculated from a person's weight and height. In most adults, the number is used to find how much of an adult's weight is made up of fat. BMI is not as accurate as a direct measure of body fat.  How is BMI calculated?  BMI is calculated by dividing weight in kilograms by height in meters squared. It can also be calculated by dividing weight in pounds by height in inches squared, then multiplying the resulting number by 703. Charts are available to help you find your BMI quickly and easily without doing this calculation.  How is BMI interpreted?  Health care professionals use BMI charts to identify whether an adult is underweight, at a normal weight, or overweight based on the following guidelines:  · Underweight: BMI less than 18.5.  · Normal weight: BMI between 18.5 and 24.9.  · Overweight: BMI between 25 and 29.9.  · Obese: BMI of 30 and above.    BMI is usually interpreted the same for males and females.  Weight includes both fat and muscle, so someone with a muscular build, such as an athlete, may have a BMI that is higher than 24.9. In cases like these, BMI may not accurately depict body fat. To determine if excess body fat is the cause of a BMI of 25 or higher, further assessments may need to be done by a health care provider.  Why is BMI a useful tool?  BMI is used to identify a possible weight problem that may be related to a medical problem or may increase the risk for medical problems. BMI can also be used to promote changes to reach a healthy weight.  This information is not intended to replace advice given to you by your health care provider. Make sure you discuss any questions you have  with your health care provider.  Document Released: 08/29/2005 Document Revised: 04/27/2017 Document Reviewed: 05/15/2015  [employer] Interactive Patient Education © 2018 [employer]

## 2019-05-02 LAB
CARDIOLIPIN IGA SER IA-ACNC: 11 APL U/ML (ref 0–11)
CARDIOLIPIN IGG SER IA-ACNC: <9 GPL U/ML (ref 0–14)
CARDIOLIPIN IGM SER IA-ACNC: <9 MPL U/ML (ref 0–12)
HCYS SERPL-SCNC: 14.6 UMOL/L (ref 0–15)

## 2019-05-03 LAB
FACT V ACT/NOR PPP: 107 % (ref 70–150)
LA NT PLATELET PPP: NORMAL SEC
PROT S FREE PPP-ACNC: 112 % (ref 57–157)
PROT S PPP-ACNC: 87 % (ref 60–150)
SCREEN DRVVT: NORMAL S

## 2019-05-05 LAB — PROT C AG PPP IA-ACNC: 115 % (ref 60–150)

## 2019-05-06 LAB — F2 GENE MUT ANL BLD/T: NORMAL

## 2019-05-10 DIAGNOSIS — I63.9 CEREBELLAR STROKE (HCC): Primary | ICD-10-CM

## 2019-05-22 RX ORDER — ATORVASTATIN CALCIUM 40 MG/1
TABLET, FILM COATED ORAL
Qty: 30 TABLET | Refills: 2 | Status: SHIPPED | OUTPATIENT
Start: 2019-05-22 | End: 2019-07-23

## 2019-05-24 DIAGNOSIS — M25.512 CHRONIC LEFT SHOULDER PAIN: Primary | ICD-10-CM

## 2019-05-24 DIAGNOSIS — G89.29 CHRONIC LEFT SHOULDER PAIN: Primary | ICD-10-CM

## 2019-06-05 ENCOUNTER — HOSPITAL ENCOUNTER (OUTPATIENT)
Dept: MRI IMAGING | Facility: HOSPITAL | Age: 49
Discharge: HOME | End: 2019-06-05
Admitting: CLINICAL NURSE SPECIALIST

## 2019-06-05 DIAGNOSIS — M25.512 CHRONIC LEFT SHOULDER PAIN: ICD-10-CM

## 2019-06-05 DIAGNOSIS — G89.29 CHRONIC LEFT SHOULDER PAIN: ICD-10-CM

## 2019-06-05 LAB — CREAT BLDA-MCNC: 0.9 MG/DL (ref 0.6–1.3)

## 2019-06-05 PROCEDURE — 82565 ASSAY OF CREATININE: CPT

## 2019-06-05 PROCEDURE — 73223 MRI JOINT UPR EXTR W/O&W/DYE: CPT

## 2019-06-05 PROCEDURE — A9577 INJ MULTIHANCE: HCPCS | Performed by: CLINICAL NURSE SPECIALIST

## 2019-06-05 PROCEDURE — 0 GADOBENATE DIMEGLUMINE 529 MG/ML SOLUTION: Performed by: CLINICAL NURSE SPECIALIST

## 2019-06-05 RX ADMIN — GADOBENATE DIMEGLUMINE 15 ML: 529 INJECTION, SOLUTION INTRAVENOUS at 12:05

## 2019-07-02 RX ORDER — LISINOPRIL 10 MG/1
TABLET ORAL
Qty: 30 TABLET | Refills: 5 | OUTPATIENT
Start: 2019-07-02

## 2019-07-05 RX ORDER — LISINOPRIL 10 MG/1
10 TABLET ORAL DAILY
Qty: 30 TABLET | Refills: 5 | OUTPATIENT
Start: 2019-07-05

## 2019-07-08 RX ORDER — ASPIRIN 81 MG/1
81 TABLET, CHEWABLE ORAL DAILY
Qty: 30 TABLET | Refills: 5 | Status: SHIPPED | OUTPATIENT
Start: 2019-07-08

## 2019-07-12 RX ORDER — LISINOPRIL 10 MG/1
TABLET ORAL
Qty: 30 TABLET | Refills: 5 | OUTPATIENT
Start: 2019-07-12

## 2019-07-15 ENCOUNTER — NURSE TRIAGE (OUTPATIENT)
Dept: CALL CENTER | Facility: HOSPITAL | Age: 49
End: 2019-07-15

## 2019-07-15 ENCOUNTER — PATIENT MESSAGE (OUTPATIENT)
Dept: INTERNAL MEDICINE | Facility: CLINIC | Age: 49
End: 2019-07-15

## 2019-07-16 NOTE — TELEPHONE ENCOUNTER
"Caller states out of b/p medication ten day's now and b/p 153/110 and 168/112 she denies all symptom's states some of a headache. Call was placed to Dr. Julian and he will give seven day of medication and she was advised call office in the morning. She is aware will be able to  b/p medication shortly. She was educated to monitor b/p and when to seek emergent care.     Reason for Disposition  • Ran out of BP medications    Additional Information  • Negative: Difficult to awaken or acting confused (e.g., disoriented, slurred speech)  • Negative: Severe difficulty breathing (e.g., struggling for each breath, speaks in single words)  • Negative: [1] Weakness of the face, arm or leg on one side of the body AND [2] new onset  • Negative: [1] Numbness (i.e., loss of sensation) of the face, arm or leg on one side of the body AND [2] new onset  • Negative: [1] Chest pain lasts > 5 minutes AND [2] history of heart disease  (i.e., heart attack, bypass surgery, angina, angioplasty, CHF)  • Negative: [1] Chest pain AND [2] took nitrogylcerin AND [3] pain was not relieved  • Negative: Sounds like a life-threatening emergency to the triager  • Negative: Symptom is main concern  (e.g., headache, chest pain)  • Negative: Low blood pressure is main concern  • Negative: [1] Systolic BP  >= 160 OR Diastolic >= 100 AND [2] cardiac or neurologic symptoms (e.g., chest pain, difficulty breathing, unsteady gait, blurred vision)  • Negative: [1] Pregnant > 20 weeks AND [2] new hand or face swelling  • Negative: [1] Pregnant > 20 weeks AND [2] BP Systolic BP  >= 140 OR Diastolic >= 90  • Negative: [1] Systolic BP  >= 200 OR Diastolic >= 120  AND [2] having NO cardiac or neurologic symptoms  • Negative: [1] Systolic BP  >= 180 OR Diastolic >= 110 AND [2] missed most recent dose of blood pressure medication  • Negative: Systolic BP  >= 180 OR Diastolic >= 110    Answer Assessment - Initial Assessment Questions  1. BLOOD PRESSURE: \"What " "is the blood pressure?\" \"Did you take at least two measurements 5 minutes apart?\"      153/110 and 168/112  2. ONSET: \"When did you take your blood pressure?\"      Hour ago   3. HOW: \"How did you obtain the blood pressure?\" (e.g., visiting nurse, automatic home BP monitor)      Automatic   4. HISTORY: \"Do you have a history of high blood pressure?\"      Yes and stroke in past   5. MEDICATIONS: \"Are you taking any medications for blood pressure?\" \"Have you missed any doses recently?\"       I've been out of my medication for ten day's   6. OTHER SYMPTOMS: \"Do you have any symptoms?\" (e.g., headache, chest pain, blurred vision, difficulty breathing, weakness)      Headache   7. PREGNANCY: \"Is there any chance you are pregnant?\" \"When was your last menstrual period?\"      N/a    Protocols used: HIGH BLOOD PRESSURE-ADULT-AH      "

## 2019-07-23 ENCOUNTER — OFFICE VISIT (OUTPATIENT)
Dept: INTERNAL MEDICINE | Facility: CLINIC | Age: 49
End: 2019-07-23

## 2019-07-23 VITALS
HEART RATE: 82 BPM | HEIGHT: 65 IN | DIASTOLIC BLOOD PRESSURE: 98 MMHG | SYSTOLIC BLOOD PRESSURE: 138 MMHG | OXYGEN SATURATION: 96 % | BODY MASS INDEX: 27.41 KG/M2 | WEIGHT: 164.5 LBS | RESPIRATION RATE: 16 BRPM

## 2019-07-23 DIAGNOSIS — E78.2 MIXED HYPERLIPIDEMIA: ICD-10-CM

## 2019-07-23 DIAGNOSIS — I10 ESSENTIAL HYPERTENSION: Primary | ICD-10-CM

## 2019-07-23 DIAGNOSIS — I63.9 CEREBELLAR STROKE (HCC): ICD-10-CM

## 2019-07-23 DIAGNOSIS — E66.3 OVERWEIGHT (BMI 25.0-29.9): ICD-10-CM

## 2019-07-23 PROCEDURE — 99214 OFFICE O/P EST MOD 30 MIN: CPT | Performed by: INTERNAL MEDICINE

## 2019-07-23 RX ORDER — LISINOPRIL 10 MG/1
10 TABLET ORAL DAILY
Qty: 90 TABLET | Refills: 1 | Status: SHIPPED | OUTPATIENT
Start: 2019-07-23

## 2019-07-23 RX ORDER — ROSUVASTATIN CALCIUM 20 MG/1
20 TABLET, COATED ORAL DAILY
Qty: 90 TABLET | Refills: 1 | Status: SHIPPED | OUTPATIENT
Start: 2019-07-23

## 2019-07-23 NOTE — PROGRESS NOTES
"CC: Follow-up hypertension    History:  Albina Lopez is a 48 y.o. female   She reports she has been doing reasonably well, though her blood pressure has been somewhat variable.  When her systolic values dropped into the 110s, she often does not take her pill for that day and subsequently sees an increase to the 150s systolic.  She has had no symptoms of recurrent stroke and continues on aspirin and statin, though she has had myalgias with Lipitor.  She wonders if there is any alternative.  She has not had any further dizziness and has been watching her diet and has lost 12 pounds as a result.  She is very pleased with this.    ROS:  Review of Systems   Constitutional: Negative for chills and fever.   Respiratory: Negative for cough and shortness of breath.    Cardiovascular: Negative for chest pain and palpitations.   Gastrointestinal: Negative for abdominal pain and constipation.   Genitourinary: Negative for difficulty urinating and dysuria.   Neurological: Negative for dizziness.        reports that she has quit smoking. She has never used smokeless tobacco. She reports that she drinks alcohol. She reports that she does not use drugs.      Current Outpatient Medications:   •  aspirin 81 MG chewable tablet, Chew 1 tablet Daily. chew and swallow., Disp: 30 tablet, Rfl: 5  •  atorvastatin (LIPITOR) 40 MG tablet, TAKE 1 TABLET BY MOUTH ONCE DAILY, Disp: 30 tablet, Rfl: 2  •  cetirizine (zyrTEC) 10 MG tablet, Take 10 mg by mouth Daily., Disp: , Rfl:   •  lisinopril (PRINIVIL,ZESTRIL) 10 MG tablet, Take 1 tablet by mouth Daily., Disp: 90 tablet, Rfl: 1    OBJECTIVE:  /98 (BP Location: Left arm, Patient Position: Sitting, Cuff Size: Adult)   Pulse 82   Resp 16   Ht 165.1 cm (65\")   Wt 74.6 kg (164 lb 8 oz)   SpO2 96%   Breastfeeding? No   BMI 27.37 kg/m²    Physical Exam   Constitutional: She is oriented to person, place, and time. She appears well-nourished. No distress.   Cardiovascular: Normal rate, " regular rhythm and normal heart sounds.   No murmur heard.  Pulmonary/Chest: Effort normal and breath sounds normal. She has no wheezes.   Neurological: She is alert and oriented to person, place, and time.   Psychiatric: She has a normal mood and affect.       Assessment/Plan    Diagnoses and all orders for this visit:    Essential hypertension  -     lisinopril (PRINIVIL,ZESTRIL) 10 MG tablet; Take 1 tablet by mouth Daily.  Fair control, BP goal for age is <140/90 per JNC 8 guidelines and Encourage daily use of lisinopril.  I stressed that systolic values in the 110s should not be feared and unless her dizziness recurs, our goal is systolic less than 130 ideally given her history of stroke.    Cerebellar stroke (CMS/HCC)  Stable on aspirin and statin without any recurrent symptoms.    Overweight (BMI 25.0-29.9)  Recommended attention to portion control and being careful about the types and timing of meals for the purpose of weight management.  She is congratulated on recent loss.    Mixed hyperlipidemia  -     rosuvastatin (CRESTOR) 20 MG tablet; Take 1 tablet by mouth Daily.  Change to Crestor given myalgias and desire for improved function.      An After Visit Summary was printed and given to the patient at discharge.  Return in about 6 months (around 1/23/2020) for Annual physical.         Bunny Julian D.O. 7/23/2019   Electronically signed.

## 2020-03-01 DIAGNOSIS — I10 ESSENTIAL HYPERTENSION: ICD-10-CM

## 2020-03-04 RX ORDER — LISINOPRIL 10 MG/1
TABLET ORAL
Qty: 90 TABLET | Refills: 0 | OUTPATIENT
Start: 2020-03-04